# Patient Record
Sex: MALE | Race: WHITE | NOT HISPANIC OR LATINO | Employment: FULL TIME | URBAN - METROPOLITAN AREA
[De-identification: names, ages, dates, MRNs, and addresses within clinical notes are randomized per-mention and may not be internally consistent; named-entity substitution may affect disease eponyms.]

---

## 2018-03-22 ENCOUNTER — OFFICE VISIT (OUTPATIENT)
Dept: URGENT CARE | Facility: CLINIC | Age: 53
End: 2018-03-22
Payer: COMMERCIAL

## 2018-03-22 VITALS
DIASTOLIC BLOOD PRESSURE: 91 MMHG | WEIGHT: 175 LBS | OXYGEN SATURATION: 98 % | SYSTOLIC BLOOD PRESSURE: 139 MMHG | HEART RATE: 80 BPM | RESPIRATION RATE: 16 BRPM | BODY MASS INDEX: 25.92 KG/M2 | TEMPERATURE: 98 F | HEIGHT: 69 IN

## 2018-03-22 DIAGNOSIS — J01.00 ACUTE MAXILLARY SINUSITIS, RECURRENCE NOT SPECIFIED: Primary | ICD-10-CM

## 2018-03-22 PROCEDURE — 96372 THER/PROPH/DIAG INJ SC/IM: CPT | Mod: S$GLB,,, | Performed by: EMERGENCY MEDICINE

## 2018-03-22 PROCEDURE — 99203 OFFICE O/P NEW LOW 30 MIN: CPT | Mod: 25,S$GLB,, | Performed by: NURSE PRACTITIONER

## 2018-03-22 RX ORDER — BETAMETHASONE SODIUM PHOSPHATE AND BETAMETHASONE ACETATE 3; 3 MG/ML; MG/ML
9 INJECTION, SUSPENSION INTRA-ARTICULAR; INTRALESIONAL; INTRAMUSCULAR; SOFT TISSUE ONCE
Status: COMPLETED | OUTPATIENT
Start: 2018-03-22 | End: 2018-03-22

## 2018-03-22 RX ORDER — AMOXICILLIN AND CLAVULANATE POTASSIUM 875; 125 MG/1; MG/1
1 TABLET, FILM COATED ORAL 2 TIMES DAILY
Qty: 20 TABLET | Refills: 0 | Status: SHIPPED | OUTPATIENT
Start: 2018-03-22 | End: 2018-04-01

## 2018-03-22 RX ORDER — FINASTERIDE 1 MG/1
5 TABLET, FILM COATED ORAL DAILY
COMMUNITY

## 2018-03-22 RX ADMIN — BETAMETHASONE SODIUM PHOSPHATE AND BETAMETHASONE ACETATE 9 MG: 3; 3 INJECTION, SUSPENSION INTRA-ARTICULAR; INTRALESIONAL; INTRAMUSCULAR; SOFT TISSUE at 04:03

## 2018-03-22 NOTE — PROGRESS NOTES
"Subjective:       Patient ID: Alirio Hooper is a 53 y.o. male.    Vitals:  height is 5' 9" (1.753 m) and weight is 79.4 kg (175 lb). His temperature is 98.3 °F (36.8 °C). His blood pressure is 139/91 (abnormal) and his pulse is 80. His respiration is 16 and oxygen saturation is 98%.     Chief Complaint: Sinus Problem    Pt having some sinus pressure, started today. Been having a cold for about a week. Pt blowing out mucus out of the nose. Feeling fatigued. Denies body aches. Pt tried taking mucinex.       Sinus Problem   This is a new problem. The current episode started today. His pain is at a severity of 6/10. Associated symptoms include congestion, headaches and sinus pressure. Pertinent negatives include no chills, coughing, ear pain, hoarse voice, shortness of breath or sore throat.     Review of Systems   Constitution: Positive for malaise/fatigue. Negative for chills, decreased appetite and fever.   HENT: Positive for congestion and sinus pressure. Negative for ear pain, hoarse voice and sore throat.    Eyes: Negative for discharge and redness.   Cardiovascular: Negative for chest pain, dyspnea on exertion and leg swelling.   Respiratory: Negative for cough, shortness of breath, sputum production and wheezing.    Musculoskeletal: Negative for myalgias.   Gastrointestinal: Negative for abdominal pain, constipation, diarrhea and nausea.   Neurological: Positive for headaches. Negative for dizziness and light-headedness.       Objective:      Physical Exam   Constitutional: He is oriented to person, place, and time. He appears well-developed and well-nourished. He is cooperative.  Non-toxic appearance. He does not appear ill. No distress.   HENT:   Head: Normocephalic and atraumatic.   Right Ear: Hearing, tympanic membrane and ear canal normal.   Left Ear: Hearing, tympanic membrane and ear canal normal.   Nose: Sinus tenderness present. No mucosal edema, rhinorrhea or nasal deformity. No epistaxis. Right sinus " exhibits maxillary sinus tenderness. Right sinus exhibits no frontal sinus tenderness. Left sinus exhibits no maxillary sinus tenderness and no frontal sinus tenderness.   Mouth/Throat: Uvula is midline and mucous membranes are normal. No trismus in the jaw. Normal dentition. No uvula swelling. Posterior oropharyngeal edema and posterior oropharyngeal erythema present. Tonsils are 1+ on the left.   Eyes: Conjunctivae and lids are normal. Right eye exhibits no discharge. Left eye exhibits no discharge. No scleral icterus.   Sclera clear bilat   Neck: Trachea normal, normal range of motion, full passive range of motion without pain and phonation normal. Neck supple.   Cardiovascular: Normal rate, regular rhythm, normal heart sounds, intact distal pulses and normal pulses.    Pulmonary/Chest: Effort normal and breath sounds normal. No respiratory distress.   Abdominal: Soft. Normal appearance. He exhibits no distension, no pulsatile midline mass and no mass. There is no tenderness.   Musculoskeletal: Normal range of motion. He exhibits no edema or deformity.   Neurological: He is alert and oriented to person, place, and time. He exhibits normal muscle tone. Coordination normal.   Skin: Skin is warm, dry and intact. He is not diaphoretic. No pallor.   Psychiatric: He has a normal mood and affect. His speech is normal and behavior is normal. Judgment and thought content normal. Cognition and memory are normal.   Nursing note and vitals reviewed.      Assessment:       1. Acute maxillary sinusitis, recurrence not specified        Plan:         Patient Instructions     Sinusitis (Antibiotic Treatment)    The sinuses are air-filled spaces within the bones of the face. They connect to the inside of the nose. Sinusitis is an inflammation of the tissue lining the sinus cavity. Sinus inflammation can occur during a cold. It can also be due to allergies to pollens and other particles in the air. Sinusitis can cause symptoms of  sinus congestion and fullness. A sinus infection causes fever, headache and facial pain. There is often green or yellow drainage from the nose or into the back of the throat (post-nasal drip). You have been given antibiotics to treat this condition.  Home care:  · Take the full course of antibiotics as instructed. Do not stop taking them, even if you feel better.  · Drink plenty of water, hot tea, and other liquids. This may help thin mucus. It also may promote sinus drainage.  · Heat may help soothe painful areas of the face. Use a towel soaked in hot water. Or,  the shower and direct the hot spray onto your face. Using a vaporizer along with a menthol rub at night may also help.   · An expectorant containing guaifenesin may help thin the mucus and promote drainage from the sinuses.  · Over-the-counter decongestants may be used unless a similar medicine was prescribed. Nasal sprays work the fastest. Use one that contains phenylephrine or oxymetazoline. First blow the nose gently. Then use the spray. Do not use these medicines more often than directed on the label or symptoms may get worse. You may also use tablets containing pseudoephedrine. Avoid products that combine ingredients, because side effects may be increased. Read labels. You can also ask the pharmacist for help. (NOTE: Persons with high blood pressure should not use decongestants. They can raise blood pressure.)  · Over-the-counter antihistamines may help if allergies contributed to your sinusitis.    · Do not use nasal rinses or irrigation during an acute sinus infection, unless told to by your health care provider. Rinsing may spread the infection to other sinuses.  · Use acetaminophen or ibuprofen to control pain, unless another pain medicine was prescribed. (If you have chronic liver or kidney disease or ever had a stomach ulcer, talk with your doctor before using these medicines. Aspirin should never be used in anyone under 18 years of age  who is ill with a fever. It may cause severe liver damage.)  · Don't smoke. This can worsen symptoms.  Follow-up care  Follow up with your healthcare provider or our staff if you are not improving within the next week.  When to seek medical advice  Call your healthcare provider if any of these occur:  · Facial pain or headache becoming more severe  · Stiff neck  · Unusual drowsiness or confusion  · Swelling of the forehead or eyelids  · Vision problems, including blurred or double vision  · Fever of 100.4ºF (38ºC) or higher, or as directed by your healthcare provider  · Seizure  · Breathing problems  · Symptoms not resolving within 10 days  Date Last Reviewed: 4/13/2015  © 2372-4482 [employer]. [address] [place] [address], [place] [address]. All rights reserved. This information is not intended as a substitute for professional medical care. Always follow your healthcare professional's instructions.              Acute maxillary sinusitis, recurrence not specified    Other orders  -     betamethasone acetate-betamethasone sodium phosphate injection 9 mg; Inject 1.5 mLs (9 mg total) into the muscle once.  -     amoxicillin-clavulanate 875-125mg (AUGMENTIN) 875-125 mg per tablet; Take 1 tablet by mouth 2 (two) times daily.  Dispense: 20 tablet; Refill: 0

## 2018-03-22 NOTE — PATIENT INSTRUCTIONS
Sinusitis (Antibiotic Treatment)    The sinuses are air-filled spaces within the bones of the face. They connect to the inside of the nose. Sinusitis is an inflammation of the tissue lining the sinus cavity. Sinus inflammation can occur during a cold. It can also be due to allergies to pollens and other particles in the air. Sinusitis can cause symptoms of sinus congestion and fullness. A sinus infection causes fever, headache and facial pain. There is often green or yellow drainage from the nose or into the back of the throat (post-nasal drip). You have been given antibiotics to treat this condition.  Home care:  · Take the full course of antibiotics as instructed. Do not stop taking them, even if you feel better.  · Drink plenty of water, hot tea, and other liquids. This may help thin mucus. It also may promote sinus drainage.  · Heat may help soothe painful areas of the face. Use a towel soaked in hot water. Or,  the shower and direct the hot spray onto your face. Using a vaporizer along with a menthol rub at night may also help.   · An expectorant containing guaifenesin may help thin the mucus and promote drainage from the sinuses.  · Over-the-counter decongestants may be used unless a similar medicine was prescribed. Nasal sprays work the fastest. Use one that contains phenylephrine or oxymetazoline. First blow the nose gently. Then use the spray. Do not use these medicines more often than directed on the label or symptoms may get worse. You may also use tablets containing pseudoephedrine. Avoid products that combine ingredients, because side effects may be increased. Read labels. You can also ask the pharmacist for help. (NOTE: Persons with high blood pressure should not use decongestants. They can raise blood pressure.)  · Over-the-counter antihistamines may help if allergies contributed to your sinusitis.    · Do not use nasal rinses or irrigation during an acute sinus infection, unless told to by  your health care provider. Rinsing may spread the infection to other sinuses.  · Use acetaminophen or ibuprofen to control pain, unless another pain medicine was prescribed. (If you have chronic liver or kidney disease or ever had a stomach ulcer, talk with your doctor before using these medicines. Aspirin should never be used in anyone under 18 years of age who is ill with a fever. It may cause severe liver damage.)  · Don't smoke. This can worsen symptoms.  Follow-up care  Follow up with your healthcare provider or our staff if you are not improving within the next week.  When to seek medical advice  Call your healthcare provider if any of these occur:  · Facial pain or headache becoming more severe  · Stiff neck  · Unusual drowsiness or confusion  · Swelling of the forehead or eyelids  · Vision problems, including blurred or double vision  · Fever of 100.4ºF (38ºC) or higher, or as directed by your healthcare provider  · Seizure  · Breathing problems  · Symptoms not resolving within 10 days  Date Last Reviewed: 4/13/2015  © 9221-9654 [email or web address]. [address] [place] [address], [place] [address]. All rights reserved. This information is not intended as a substitute for professional medical care. Always follow your healthcare professional's instructions.

## 2018-05-19 ENCOUNTER — OFFICE VISIT (OUTPATIENT)
Dept: URGENT CARE | Facility: CLINIC | Age: 53
End: 2018-05-19
Payer: COMMERCIAL

## 2018-05-19 VITALS
HEIGHT: 69 IN | DIASTOLIC BLOOD PRESSURE: 72 MMHG | WEIGHT: 175 LBS | RESPIRATION RATE: 18 BRPM | OXYGEN SATURATION: 99 % | HEART RATE: 86 BPM | SYSTOLIC BLOOD PRESSURE: 108 MMHG | TEMPERATURE: 98 F | BODY MASS INDEX: 25.92 KG/M2

## 2018-05-19 DIAGNOSIS — M79.662 PAIN OF LEFT CALF: Primary | ICD-10-CM

## 2018-05-19 PROCEDURE — 99214 OFFICE O/P EST MOD 30 MIN: CPT | Mod: S$GLB,,, | Performed by: NURSE PRACTITIONER

## 2018-05-19 PROCEDURE — 3008F BODY MASS INDEX DOCD: CPT | Mod: CPTII,S$GLB,, | Performed by: NURSE PRACTITIONER

## 2018-05-19 NOTE — PATIENT INSTRUCTIONS
TAKE ALIEVE AS DIRECTED FOR DISCOMFORT.  ELEVATE, ICE AREA FOR 20 MINUTES 4-5 TIMES A DAY FOR THE NEXT 2 DAYS.  IF SYMPTOMS PERSIST OR YOU START TO HAVE SWELLING, REDNESS, INCREASE IN PAIN, FOLLOW UP WITH PRIMARY CARE FOR ULTRASOUND OF LE OR THE EMERGENCY DEPARTMENT.         R.I.C.E.    R.I.C.E. stands for Rest, Ice, Compression, and Elevation. Doing these things helps limit pain and swelling after an injury. R.I.C.E. also helps injuries heal faster. Use R.I.C.E. for sprains, strains, and severe bruises or bumps. Follow the tips on this handout and begin R.I.C.E. as soon as possible after an injury.  ? Rest  Pain is your bodys way of telling you to rest an injured area. Whether you have hurt an elbow, hand, foot, or knee, limiting its use will prevent further injury and help you heal.  ? Ice  Applying ice right after an injury helps prevent swelling and reduce pain. Dont place ice directly on your skin.  · Wrap a cold pack or bag of ice in a thin cloth. Place it over the injured area.  · Ice for 10 minutes every 3 hours. Dont ice for more than 20 minutes at a time.  ? Compression  Putting pressure (compression) on an injury helps prevent swelling and provides support.  · Wrap the injured area firmly with an elastic bandage. If your hand or foot tingles, becomes discolored, or feels cold to the touch, the bandage may be too tight. Rewrap it more loosely.  · If your bandage becomes too loose, rewrap it.  · Do not wear an elastic bandage overnight.  ? Elevation  Keeping an injury elevated helps reduce swelling, pain, and throbbing. Elevation is most effective when the injury is kept elevated higher than the heart.     Call your healthcare provider if you notice any of the following:  · Fingers or toes feel numb, are cold to the touch, or change color  · Skin looks shiny or tight  · Pain, swelling, or bruising worsens and is not improved with elevation   Date Last Reviewed: 9/3/2015  © 9555-7016 [employer]. [address] [place] [address]. All rights reserved. This information is not intended as a substitute for professional medical care. Always follow your healthcare professional's instructions.        Calf Raise (Strength)    1. Stand up straight with both feet flat on the floor, slightly apart. Hold onto a sturdy chair, railing, counter, or table.  2. Raise both heels so youre standing on the balls of your feet. Dont lock your knees or arch your back. Hold for 5 seconds. Then slowly lower your heels back down to the floor.  3. Repeat 10 times, or as instructed.  4. Do this exercise 3 times a day, or as instructed.     Challenge yourself  As you become stronger, do this exercise on one foot at a time.   Date Last Reviewed: 3/10/2016  © 3964-3970 [employer]. [address] [place] [address]. All rights reserved. This information is not intended as a substitute for professional medical care. Always follow your healthcare professional's instructions.

## 2018-05-19 NOTE — PROGRESS NOTES
"Subjective:       Patient ID: Alirio Hooper is a 53 y.o. male.    Vitals:    05/19/18 1749   BP: 108/72   Pulse: 86   Resp: 18   Temp: 98.3 °F (36.8 °C)   SpO2: 99%   Weight: 79.4 kg (175 lb)   Height: 5' 9" (1.753 m)       Chief Complaint: Leg Pain (CALF 2 DAYS NOW LT LEG )    Patient started with aching pain around mid calf, left leg x 1 day. No pain when not moving, bearing weight. Denies a particular incident to cause injury. 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 He denies recent long travel, denies recent injury, long immobilization, denies warmth to touch or leg swelling. Non smoker, no anticoagulant use. Denies shortness of breath.      Leg Pain    The incident occurred 2 days ago. The incident occurred at home. The injury mechanism is unknown. The pain is present in the left leg. The quality of the pain is described as aching. The pain is at a severity of 4/10. The pain is mild. The pain has been constant since onset. He reports no foreign bodies present. Exacerbated by: FOOT MOVEMENT  He has tried nothing for the symptoms.     Review of Systems   Constitution: Negative for chills and fever.   HENT: Negative for sore throat.    Eyes: Negative for blurred vision.   Cardiovascular: Negative for chest pain.   Respiratory: Negative for shortness of breath.    Skin: Negative for rash.   Musculoskeletal: Negative for back pain and joint pain.   Gastrointestinal: Negative for abdominal pain, diarrhea, nausea and vomiting.   Neurological: Negative for headaches.   Psychiatric/Behavioral: The patient is not nervous/anxious.        Objective:      Physical Exam   Constitutional: He is oriented to person, place, and time. He appears well-developed and well-nourished. He is cooperative.  Non-toxic appearance. He does not appear ill. No distress.   HENT:   Head: Normocephalic and atraumatic. Head is without abrasion, without contusion and without laceration.   Right Ear: Hearing, tympanic membrane, external ear and ear " canal normal. No hemotympanum.   Left Ear: Hearing, tympanic membrane, external ear and ear canal normal. No hemotympanum.   Nose: Nose normal. No mucosal edema, rhinorrhea or nasal deformity. No epistaxis. Right sinus exhibits no maxillary sinus tenderness and no frontal sinus tenderness. Left sinus exhibits no maxillary sinus tenderness and no frontal sinus tenderness.   Mouth/Throat: Uvula is midline, oropharynx is clear and moist and mucous membranes are normal. No trismus in the jaw. Normal dentition. No uvula swelling. No posterior oropharyngeal erythema.   Eyes: Conjunctivae, EOM and lids are normal. Pupils are equal, round, and reactive to light. Right eye exhibits no discharge. Left eye exhibits no discharge. No scleral icterus.   Sclera clear bilat   Neck: Trachea normal, normal range of motion, full passive range of motion without pain and phonation normal. Neck supple. No spinous process tenderness and no muscular tenderness present. No neck rigidity. No tracheal deviation present.   Cardiovascular: Normal rate, regular rhythm, intact distal pulses and normal pulses.    Pulmonary/Chest: Effort normal. No respiratory distress.   Abdominal: Soft. Normal appearance and bowel sounds are normal. He exhibits no distension, no pulsatile midline mass and no mass. There is no tenderness.   Musculoskeletal: Normal range of motion. He exhibits no deformity.        Right shoulder: He exhibits normal range of motion, no tenderness (left lateral mid-calf tenderness to deep palpation), no bony tenderness, no swelling, no deformity, no laceration, no pain (aching with dorsiflexion), no spasm, normal pulse and normal strength.        Left lower leg: He exhibits tenderness (tenderness lateral, mid calf to deep palpation). He exhibits no swelling, no edema, no deformity and no laceration.        Legs:  RIGHT CALF SUPERIOR ASPECT: 35CM, MEDIAL CALF 39 1/2CM    LEFT CALF SUPERIOR ASPECT: 34.5CM, MEDIAL CALF 38.5CM    Neurological: He is alert and oriented to person, place, and time. He has normal strength. No cranial nerve deficit or sensory deficit. He exhibits normal muscle tone. He displays no seizure activity. Coordination normal. GCS eye subscore is 4. GCS verbal subscore is 5. GCS motor subscore is 6.   Skin: Skin is warm, dry and intact. Capillary refill takes less than 2 seconds. No abrasion, no bruising, no burn, no ecchymosis and no laceration noted. He is not diaphoretic. No pallor.   Psychiatric: He has a normal mood and affect. His speech is normal and behavior is normal. Judgment and thought content normal. Cognition and memory are normal.   Nursing note and vitals reviewed.      Assessment:       1. Pain of left calf        Plan:     Advised patient symptoms appear likely musculoskeletal in nature. Advised patient to treat with nsaids, elevation, ice. If no improvement, he should follow up with his pcp Monday for possible ultrasound. Advised if calf becomes warm, swollen, more painful, to go directly to emergency department. Patient agreed and verbalized understanding.     Alirio was seen today for leg pain.    Diagnoses and all orders for this visit:    Pain of left calf          Patient Instructions     TAKE ALIEVE AS DIRECTED FOR DISCOMFORT.  ELEVATE, ICE AREA FOR 20 MINUTES 4-5 TIMES A DAY FOR THE NEXT 2 DAYS.  IF SYMPTOMS PERSIST OR YOU START TO HAVE SWELLING, REDNESS, INCREASE IN PAIN, FOLLOW UP WITH PRIMARY CARE FOR ULTRASOUND OF LE OR THE EMERGENCY DEPARTMENT.         R.I.C.E.    R.I.C.E. stands for Rest, Ice, Compression, and Elevation. Doing these things helps limit pain and swelling after an injury. R.I.C.E. also helps injuries heal faster. Use R.I.C.E. for sprains, strains, and severe bruises or bumps. Follow the tips on this handout and begin R.I.C.E. as soon as possible after an injury.  ? Rest  Pain is your bodys way of telling you to rest an injured area. Whether you have hurt an elbow, hand,  foot, or knee, limiting its use will prevent further injury and help you heal.  ? Ice  Applying ice right after an injury helps prevent swelling and reduce pain. Dont place ice directly on your skin.  · Wrap a cold pack or bag of ice in a thin cloth. Place it over the injured area.  · Ice for 10 minutes every 3 hours. Dont ice for more than 20 minutes at a time.  ? Compression  Putting pressure (compression) on an injury helps prevent swelling and provides support.  · Wrap the injured area firmly with an elastic bandage. If your hand or foot tingles, becomes discolored, or feels cold to the touch, the bandage may be too tight. Rewrap it more loosely.  · If your bandage becomes too loose, rewrap it.  · Do not wear an elastic bandage overnight.  ? Elevation  Keeping an injury elevated helps reduce swelling, pain, and throbbing. Elevation is most effective when the injury is kept elevated higher than the heart.     Call your healthcare provider if you notice any of the following:  · Fingers or toes feel numb, are cold to the touch, or change color  · Skin looks shiny or tight  · Pain, swelling, or bruising worsens and is not improved with elevation   Date Last Reviewed: 9/3/2015  © 1808-2500 [email or web address]. [address] [place] [address], [place] [address]. All rights reserved. This information is not intended as a substitute for professional medical care. Always follow your healthcare professional's instructions.        Calf Raise (Strength)    1. Stand up straight with both feet flat on the floor, slightly apart. Hold onto a sturdy chair, railing, counter, or table.  2. Raise both heels so youre standing on the balls of your feet. Dont lock your knees or arch your back. Hold for 5 seconds. Then slowly lower your heels back down to the floor.  3. Repeat 10 times, or as instructed.  4. Do this exercise 3 times a day, or as instructed.     Challenge yourself  As you become stronger, do this exercise on one foot  at a time.   Date Last Reviewed: 3/10/2016  © 3882-7449 [employer]. [address] [place] [address], [place] [address]. All rights reserved. This information is not intended as a substitute for professional medical care. Always follow your healthcare professional's instructions.

## 2019-02-26 ENCOUNTER — OFFICE VISIT (OUTPATIENT)
Dept: URGENT CARE | Facility: CLINIC | Age: 54
End: 2019-02-26
Payer: COMMERCIAL

## 2019-02-26 VITALS
OXYGEN SATURATION: 98 % | RESPIRATION RATE: 12 BRPM | BODY MASS INDEX: 23.7 KG/M2 | WEIGHT: 160 LBS | SYSTOLIC BLOOD PRESSURE: 108 MMHG | HEART RATE: 90 BPM | TEMPERATURE: 98 F | HEIGHT: 69 IN | DIASTOLIC BLOOD PRESSURE: 70 MMHG

## 2019-02-26 DIAGNOSIS — H92.03 OTALGIA OF BOTH EARS: ICD-10-CM

## 2019-02-26 DIAGNOSIS — J01.00 ACUTE NON-RECURRENT MAXILLARY SINUSITIS: Primary | ICD-10-CM

## 2019-02-26 PROCEDURE — 99214 OFFICE O/P EST MOD 30 MIN: CPT | Mod: S$GLB,,, | Performed by: NURSE PRACTITIONER

## 2019-02-26 PROCEDURE — 3008F BODY MASS INDEX DOCD: CPT | Mod: CPTII,S$GLB,, | Performed by: NURSE PRACTITIONER

## 2019-02-26 PROCEDURE — 3008F PR BODY MASS INDEX (BMI) DOCUMENTED: ICD-10-PCS | Mod: CPTII,S$GLB,, | Performed by: NURSE PRACTITIONER

## 2019-02-26 PROCEDURE — 99214 PR OFFICE/OUTPT VISIT, EST, LEVL IV, 30-39 MIN: ICD-10-PCS | Mod: S$GLB,,, | Performed by: NURSE PRACTITIONER

## 2019-02-26 RX ORDER — AMOXICILLIN AND CLAVULANATE POTASSIUM 875; 125 MG/1; MG/1
1 TABLET, FILM COATED ORAL 2 TIMES DAILY
Qty: 20 TABLET | Refills: 0 | Status: SHIPPED | OUTPATIENT
Start: 2019-02-26 | End: 2019-03-08

## 2019-02-26 RX ORDER — EMTRICITABINE AND TENOFOVIR DISOPROXIL FUMARATE 200; 300 MG/1; MG/1
TABLET, FILM COATED ORAL
COMMUNITY
Start: 2019-02-13 | End: 2022-12-16

## 2019-02-26 NOTE — PATIENT INSTRUCTIONS
Continue using Flonase twice daily.    Sinusitis (Antibiotic Treatment)    The sinuses are air-filled spaces within the bones of the face. They connect to the inside of the nose. Sinusitis is an inflammation of the tissue lining the sinus cavity. Sinus inflammation can occur during a cold. It can also be due to allergies to pollens and other particles in the air. Sinusitis can cause symptoms of sinus congestion and fullness. A sinus infection causes fever, headache and facial pain. There is often green or yellow drainage from the nose or into the back of the throat (post-nasal drip). You have been given antibiotics to treat this condition.  Home care:  · Take the full course of antibiotics as instructed. Do not stop taking them, even if you feel better.  · Drink plenty of water, hot tea, and other liquids. This may help thin mucus. It also may promote sinus drainage.  · Heat may help soothe painful areas of the face. Use a towel soaked in hot water. Or,  the shower and direct the hot spray onto your face. Using a vaporizer along with a menthol rub at night may also help.   · An expectorant containing guaifenesin may help thin the mucus and promote drainage from the sinuses.  · Over-the-counter decongestants may be used unless a similar medicine was prescribed. Nasal sprays work the fastest. Use one that contains phenylephrine or oxymetazoline. First blow the nose gently. Then use the spray. Do not use these medicines more often than directed on the label or symptoms may get worse. You may also use tablets containing pseudoephedrine. Avoid products that combine ingredients, because side effects may be increased. Read labels. You can also ask the pharmacist for help. (NOTE: Persons with high blood pressure should not use decongestants. They can raise blood pressure.)  · Over-the-counter antihistamines may help if allergies contributed to your sinusitis.    · Do not use nasal rinses or irrigation during an  acute sinus infection, unless told to by your health care provider. Rinsing may spread the infection to other sinuses.  · Use acetaminophen or ibuprofen to control pain, unless another pain medicine was prescribed. (If you have chronic liver or kidney disease or ever had a stomach ulcer, talk with your doctor before using these medicines. Aspirin should never be used in anyone under 18 years of age who is ill with a fever. It may cause severe liver damage.)  · Don't smoke. This can worsen symptoms.  Follow-up care  Follow up with your healthcare provider or our staff if you are not improving within the next week.  When to seek medical advice  Call your healthcare provider if any of these occur:  · Facial pain or headache becoming more severe  · Stiff neck  · Unusual drowsiness or confusion  · Swelling of the forehead or eyelids  · Vision problems, including blurred or double vision  · Fever of 100.4ºF (38ºC) or higher, or as directed by your healthcare provider  · Seizure  · Breathing problems  · Symptoms not resolving within 10 days  Date Last Reviewed: 4/13/2015  © 6386-5284 [employer]. [address] [place] [address], [place] [address]. All rights reserved. This information is not intended as a substitute for professional medical care. Always follow your healthcare professional's instructions.

## 2019-03-01 ENCOUNTER — TELEPHONE (OUTPATIENT)
Dept: URGENT CARE | Facility: CLINIC | Age: 54
End: 2019-03-01

## 2019-05-13 ENCOUNTER — OFFICE VISIT (OUTPATIENT)
Dept: URGENT CARE | Facility: CLINIC | Age: 54
End: 2019-05-13
Payer: COMMERCIAL

## 2019-05-13 VITALS
WEIGHT: 165 LBS | TEMPERATURE: 98 F | HEART RATE: 69 BPM | RESPIRATION RATE: 14 BRPM | OXYGEN SATURATION: 98 % | BODY MASS INDEX: 24.44 KG/M2 | SYSTOLIC BLOOD PRESSURE: 108 MMHG | HEIGHT: 69 IN | DIASTOLIC BLOOD PRESSURE: 73 MMHG

## 2019-05-13 DIAGNOSIS — J01.01 ACUTE RECURRENT MAXILLARY SINUSITIS: Primary | ICD-10-CM

## 2019-05-13 PROCEDURE — 3008F BODY MASS INDEX DOCD: CPT | Mod: CPTII,S$GLB,, | Performed by: NURSE PRACTITIONER

## 2019-05-13 PROCEDURE — 99214 PR OFFICE/OUTPT VISIT, EST, LEVL IV, 30-39 MIN: ICD-10-PCS | Mod: S$GLB,,, | Performed by: NURSE PRACTITIONER

## 2019-05-13 PROCEDURE — 3008F PR BODY MASS INDEX (BMI) DOCUMENTED: ICD-10-PCS | Mod: CPTII,S$GLB,, | Performed by: NURSE PRACTITIONER

## 2019-05-13 PROCEDURE — 99214 OFFICE O/P EST MOD 30 MIN: CPT | Mod: S$GLB,,, | Performed by: NURSE PRACTITIONER

## 2019-05-13 RX ORDER — AMOXICILLIN AND CLAVULANATE POTASSIUM 875; 125 MG/1; MG/1
1 TABLET, FILM COATED ORAL 2 TIMES DAILY
Qty: 20 TABLET | Refills: 0 | Status: SHIPPED | OUTPATIENT
Start: 2019-05-13 | End: 2019-05-23

## 2019-05-13 NOTE — PROGRESS NOTES
"Subjective:       Patient ID: Alirio Hooper is a 54 y.o. male.    Vitals:  height is 5' 9" (1.753 m) and weight is 74.8 kg (165 lb). His oral temperature is 97.6 °F (36.4 °C). His blood pressure is 108/73 and his pulse is 69. His respiration is 14 and oxygen saturation is 98%.     Chief Complaint: Sinus Problem    Patient presents congestion, headaches, and sinus pressure that occurred a week ago.  He has been taking OTC decongestant, and states no relief to his symptoms. He did have a sore throat at the beginning, but it now has subsided.     Chronic history of recurrent sinusitis. Seen by ENT with no clear etiology. Last abx for same was 2/2019.  Endorses several sick contacts at work.  Has not been taking Flonase or similar since last episode, but still has product at home.    Sinus Problem   This is a new problem. The current episode started in the past 7 days. The problem has been gradually worsening since onset. There has been no fever. His pain is at a severity of 6/10. The pain is moderate. Associated symptoms include congestion, headaches, a hoarse voice, sinus pressure and sneezing. Pertinent negatives include no chills, coughing, diaphoresis, ear pain, neck pain, shortness of breath, sore throat or swollen glands. Past treatments include oral decongestants. The treatment provided no relief.       Constitution: Negative for chills, sweating, fatigue and fever.   HENT: Positive for congestion, sinus pain, sinus pressure and voice change. Negative for ear pain and sore throat.    Neck: Negative for neck pain and painful lymph nodes.   Eyes: Positive for eye pain. Negative for eye redness.   Respiratory: Negative for chest tightness, cough, sputum production, bloody sputum, COPD, shortness of breath, stridor, wheezing and asthma.    Gastrointestinal: Negative for nausea and vomiting.   Musculoskeletal: Negative for muscle ache.   Skin: Negative for rash.   Allergic/Immunologic: Positive for sneezing. Negative " for seasonal allergies and asthma.   Neurological: Positive for headaches.   Hematologic/Lymphatic: Negative for swollen lymph nodes.       Objective:      Physical Exam   Constitutional: He is oriented to person, place, and time. He appears well-developed and well-nourished. He is cooperative.  Non-toxic appearance. He does not appear ill. No distress.   HENT:   Head: Normocephalic and atraumatic.   Right Ear: Hearing, tympanic membrane, external ear and ear canal normal.   Left Ear: Hearing, tympanic membrane, external ear and ear canal normal.   Nose: Mucosal edema and rhinorrhea present. No nasal deformity. No epistaxis. Right sinus exhibits no maxillary sinus tenderness and no frontal sinus tenderness. Left sinus exhibits maxillary sinus tenderness. Left sinus exhibits no frontal sinus tenderness.   Mouth/Throat: Uvula is midline and mucous membranes are normal. No trismus in the jaw. Normal dentition. No uvula swelling. Posterior oropharyngeal erythema present.       Eyes: Conjunctivae and lids are normal. No scleral icterus.   Sclera clear bilat   Neck: Trachea normal, full passive range of motion without pain and phonation normal. Neck supple.   Cardiovascular: Normal rate, regular rhythm, normal heart sounds, intact distal pulses and normal pulses.   Pulmonary/Chest: Effort normal and breath sounds normal. No stridor. No respiratory distress. He has no decreased breath sounds. He has no wheezes.   Abdominal: Soft. Normal appearance and bowel sounds are normal. He exhibits no distension. There is no tenderness.   Musculoskeletal: Normal range of motion. He exhibits no edema or deformity.   Neurological: He is alert and oriented to person, place, and time. He exhibits normal muscle tone. Coordination normal.   Skin: Skin is warm, dry and intact. He is not diaphoretic. No pallor.   Psychiatric: He has a normal mood and affect. His speech is normal and behavior is normal. Judgment and thought content normal.  Cognition and memory are normal.   Nursing note and vitals reviewed.      Assessment:       1. Acute recurrent maxillary sinusitis        Plan:         Acute recurrent maxillary sinusitis  -     amoxicillin-clavulanate 875-125mg (AUGMENTIN) 875-125 mg per tablet; Take 1 tablet by mouth 2 (two) times daily. for 10 days  Dispense: 20 tablet; Refill: 0      Patient Instructions     You may continue taking Tylenol (acetaminophen) or ibuprofen for pain and fever.    Please continue to take Flonase twice daily for the next 14 days.    Acute Sinusitis     Acute sinusitis is irritation and swelling of the sinuses. It is usually caused by a viral infection after a common cold. Your doctor can help you find relief.  What is acute sinusitis?  Sinuses are air-filled spaces in the skull behind the face. They are kept moist and clean by a lining of mucosa. Things such as pollen, smoke, and chemical fumes can irritate the mucosa. It can then swell up. As a response to irritation, the mucosa makes more mucus and other fluids. Tiny hairlike cilia cover the mucosa. Cilia help carry mucus toward the opening of the sinus. Too much mucus may cause the cilia to stop working. This blocks the sinus opening. A buildup of fluid in the sinuses then causes pain and pressure. It can also encourage bacteria to grow in the sinuses.  Common symptoms of acute sinusitis  You may have:  · Facial soreness pain  · Headache  · Fever  · Fluid draining in the back of the throat (postnasal drip)  · Congestion  · Drainage that is thick and colored, instead of clear  · Cough  Diagnosing acute sinusitis  Your doctor will ask about your symptoms and health history. He or she will look at your ear, nose, and throat. You usually won't need to have X-rays taken.    The doctor may take a sample of mucus to check for bacteria. If you have sinusitis that keeps coming back, you may need imaging tests such as X-rays or CAT scans. This will help your doctor check for a  structural problem that may be causing the infection.  Treating acute sinusitis  Treatment is aimed at unblocking the sinus opening and helping the cilia work again. You may need to take antihistamine and decongestant medicine. These can reduce inflammation and decrease the amount of fluid your sinuses make. If you have a bacterial infection, you will need to take antibiotic medicine for 10 to 14 days. Take this medicine until it is gone, even if you feel better.  Date Last Reviewed: 10/1/2016  © 7926-7818 [employer]. [address] [place] [address], [place] [address]. All rights reserved. This information is not intended as a substitute for professional medical care. Always follow your healthcare professional's instructions.

## 2019-05-13 NOTE — PATIENT INSTRUCTIONS
You may continue taking Tylenol (acetaminophen) or ibuprofen for pain and fever.    Please continue to take Flonase twice daily for the next 14 days.    Acute Sinusitis     Acute sinusitis is irritation and swelling of the sinuses. It is usually caused by a viral infection after a common cold. Your doctor can help you find relief.  What is acute sinusitis?  Sinuses are air-filled spaces in the skull behind the face. They are kept moist and clean by a lining of mucosa. Things such as pollen, smoke, and chemical fumes can irritate the mucosa. It can then swell up. As a response to irritation, the mucosa makes more mucus and other fluids. Tiny hairlike cilia cover the mucosa. Cilia help carry mucus toward the opening of the sinus. Too much mucus may cause the cilia to stop working. This blocks the sinus opening. A buildup of fluid in the sinuses then causes pain and pressure. It can also encourage bacteria to grow in the sinuses.  Common symptoms of acute sinusitis  You may have:  · Facial soreness pain  · Headache  · Fever  · Fluid draining in the back of the throat (postnasal drip)  · Congestion  · Drainage that is thick and colored, instead of clear  · Cough  Diagnosing acute sinusitis  Your doctor will ask about your symptoms and health history. He or she will look at your ear, nose, and throat. You usually won't need to have X-rays taken.    The doctor may take a sample of mucus to check for bacteria. If you have sinusitis that keeps coming back, you may need imaging tests such as X-rays or CAT scans. This will help your doctor check for a structural problem that may be causing the infection.  Treating acute sinusitis  Treatment is aimed at unblocking the sinus opening and helping the cilia work again. You may need to take antihistamine and decongestant medicine. These can reduce inflammation and decrease the amount of fluid your sinuses make. If you have a bacterial infection, you will need to take antibiotic  medicine for 10 to 14 days. Take this medicine until it is gone, even if you feel better.  Date Last Reviewed: 10/1/2016  © 5848-1593 [employer]. [address] [place] [address], [place] [address]. All rights reserved. This information is not intended as a substitute for professional medical care. Always follow your healthcare professional's instructions.

## 2019-05-16 ENCOUNTER — TELEPHONE (OUTPATIENT)
Dept: URGENT CARE | Facility: CLINIC | Age: 54
End: 2019-05-16

## 2020-04-21 ENCOUNTER — OFFICE VISIT (OUTPATIENT)
Dept: URGENT CARE | Facility: CLINIC | Age: 55
End: 2020-04-21
Payer: COMMERCIAL

## 2020-04-21 ENCOUNTER — HOSPITAL ENCOUNTER (EMERGENCY)
Facility: OTHER | Age: 55
Discharge: HOME | End: 2020-04-21
Attending: EMERGENCY MEDICINE
Payer: COMMERCIAL

## 2020-04-21 VITALS
OXYGEN SATURATION: 99 % | WEIGHT: 165 LBS | SYSTOLIC BLOOD PRESSURE: 120 MMHG | DIASTOLIC BLOOD PRESSURE: 78 MMHG | TEMPERATURE: 98 F | RESPIRATION RATE: 18 BRPM | HEART RATE: 69 BPM | BODY MASS INDEX: 24.44 KG/M2 | HEIGHT: 69 IN

## 2020-04-21 VITALS
HEART RATE: 95 BPM | TEMPERATURE: 98 F | RESPIRATION RATE: 16 BRPM | WEIGHT: 165 LBS | BODY MASS INDEX: 24.44 KG/M2 | OXYGEN SATURATION: 98 % | SYSTOLIC BLOOD PRESSURE: 121 MMHG | HEIGHT: 69 IN | DIASTOLIC BLOOD PRESSURE: 83 MMHG

## 2020-04-21 DIAGNOSIS — S62.101A RIGHT WRIST FRACTURE, CLOSED, INITIAL ENCOUNTER: Primary | ICD-10-CM

## 2020-04-21 DIAGNOSIS — T14.90XA INJURY: ICD-10-CM

## 2020-04-21 DIAGNOSIS — S52.561A CLOSED BARTON'S FRACTURE OF RIGHT RADIUS, INITIAL ENCOUNTER: Primary | ICD-10-CM

## 2020-04-21 PROCEDURE — 73110 XR WRIST COMPLETE 3 VIEWS RIGHT: ICD-10-PCS | Mod: RT,S$GLB,, | Performed by: RADIOLOGY

## 2020-04-21 PROCEDURE — 96374 THER/PROPH/DIAG INJ IV PUSH: CPT | Mod: 59

## 2020-04-21 PROCEDURE — 99284 EMERGENCY DEPT VISIT MOD MDM: CPT | Mod: 25

## 2020-04-21 PROCEDURE — 99214 OFFICE O/P EST MOD 30 MIN: CPT | Mod: S$GLB,,, | Performed by: PHYSICIAN ASSISTANT

## 2020-04-21 PROCEDURE — 63600175 PHARM REV CODE 636 W HCPCS: Performed by: EMERGENCY MEDICINE

## 2020-04-21 PROCEDURE — 96375 TX/PRO/DX INJ NEW DRUG ADDON: CPT | Mod: 59

## 2020-04-21 PROCEDURE — 73110 X-RAY EXAM OF WRIST: CPT | Mod: RT,S$GLB,, | Performed by: RADIOLOGY

## 2020-04-21 PROCEDURE — 29105 APPLICATION LONG ARM SPLINT: CPT | Mod: RT

## 2020-04-21 PROCEDURE — 25605 CLTX DST RDL FX/EPHYS SEP W/: CPT

## 2020-04-21 PROCEDURE — 99214 PR OFFICE/OUTPT VISIT, EST, LEVL IV, 30-39 MIN: ICD-10-PCS | Mod: S$GLB,,, | Performed by: PHYSICIAN ASSISTANT

## 2020-04-21 PROCEDURE — 73130 XR HAND COMPLETE 3 VIEW RIGHT: ICD-10-PCS | Mod: RT,S$GLB,, | Performed by: RADIOLOGY

## 2020-04-21 PROCEDURE — 73130 X-RAY EXAM OF HAND: CPT | Mod: RT,S$GLB,, | Performed by: RADIOLOGY

## 2020-04-21 PROCEDURE — 25000003 PHARM REV CODE 250: Performed by: EMERGENCY MEDICINE

## 2020-04-21 RX ORDER — BUPIVACAINE HYDROCHLORIDE 2.5 MG/ML
5 INJECTION, SOLUTION EPIDURAL; INFILTRATION; INTRACAUDAL
Status: COMPLETED | OUTPATIENT
Start: 2020-04-21 | End: 2020-04-21

## 2020-04-21 RX ORDER — LIDOCAINE HYDROCHLORIDE 20 MG/ML
5 INJECTION, SOLUTION INFILTRATION; PERINEURAL
Status: COMPLETED | OUTPATIENT
Start: 2020-04-21 | End: 2020-04-21

## 2020-04-21 RX ORDER — ONDANSETRON 2 MG/ML
8 INJECTION INTRAMUSCULAR; INTRAVENOUS
Status: COMPLETED | OUTPATIENT
Start: 2020-04-21 | End: 2020-04-21

## 2020-04-21 RX ORDER — HYDROCODONE BITARTRATE AND ACETAMINOPHEN 5; 325 MG/1; MG/1
1-2 TABLET ORAL EVERY 6 HOURS PRN
Qty: 16 TABLET | Refills: 0 | Status: SHIPPED | OUTPATIENT
Start: 2020-04-21 | End: 2020-05-01

## 2020-04-21 RX ORDER — IBUPROFEN 800 MG/1
800 TABLET ORAL EVERY 6 HOURS PRN
Qty: 30 TABLET | Refills: 0 | OUTPATIENT
Start: 2020-04-21 | End: 2020-10-04

## 2020-04-21 RX ORDER — HYDROMORPHONE HYDROCHLORIDE 1 MG/ML
1 INJECTION, SOLUTION INTRAMUSCULAR; INTRAVENOUS; SUBCUTANEOUS
Status: COMPLETED | OUTPATIENT
Start: 2020-04-21 | End: 2020-04-21

## 2020-04-21 RX ADMIN — BUPIVACAINE HYDROCHLORIDE 12.5 MG: 2.5 INJECTION, SOLUTION EPIDURAL; INFILTRATION; INTRACAUDAL; PERINEURAL at 08:04

## 2020-04-21 RX ADMIN — ONDANSETRON 8 MG: 2 INJECTION INTRAMUSCULAR; INTRAVENOUS at 07:04

## 2020-04-21 RX ADMIN — HYDROMORPHONE HYDROCHLORIDE 1 MG: 1 INJECTION, SOLUTION INTRAMUSCULAR; INTRAVENOUS; SUBCUTANEOUS at 07:04

## 2020-04-21 RX ADMIN — LIDOCAINE HYDROCHLORIDE 5 ML: 20 INJECTION, SOLUTION INFILTRATION; PERINEURAL at 08:04
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
## 2020-10-04 ENCOUNTER — HOSPITAL ENCOUNTER (EMERGENCY)
Facility: OTHER | Age: 55
Discharge: HOME | End: 2020-10-04
Attending: EMERGENCY MEDICINE
Payer: COMMERCIAL

## 2020-10-04 VITALS
RESPIRATION RATE: 20 BRPM | HEART RATE: 70 BPM | SYSTOLIC BLOOD PRESSURE: 137 MMHG | BODY MASS INDEX: 24.44 KG/M2 | TEMPERATURE: 98 F | WEIGHT: 165 LBS | HEIGHT: 69 IN | OXYGEN SATURATION: 100 % | DIASTOLIC BLOOD PRESSURE: 89 MMHG

## 2020-10-04 DIAGNOSIS — T14.90XA TRAUMA: ICD-10-CM

## 2020-10-04 DIAGNOSIS — S62.111A TRIQUETRAL CHIP FRACTURE, RIGHT, CLOSED, INITIAL ENCOUNTER: Primary | ICD-10-CM

## 2020-10-04 PROCEDURE — 25000003 PHARM REV CODE 250: Performed by: EMERGENCY MEDICINE

## 2020-10-04 PROCEDURE — 99284 EMERGENCY DEPT VISIT MOD MDM: CPT | Mod: 25

## 2020-10-04 RX ORDER — KETOROLAC TROMETHAMINE 10 MG/1
10 TABLET, FILM COATED ORAL
Status: COMPLETED | OUTPATIENT
Start: 2020-10-04 | End: 2020-10-04

## 2020-10-04 RX ORDER — HYDROCODONE BITARTRATE AND ACETAMINOPHEN 7.5; 325 MG/1; MG/1
1 TABLET ORAL EVERY 6 HOURS PRN
Qty: 12 TABLET | Refills: 0 | Status: SHIPPED | OUTPATIENT
Start: 2020-10-04 | End: 2022-07-13

## 2020-10-04 RX ORDER — ETODOLAC 400 MG/1
400 TABLET, FILM COATED ORAL 2 TIMES DAILY PRN
Qty: 20 TABLET | Refills: 0 | Status: SHIPPED | OUTPATIENT
Start: 2020-10-04 | End: 2022-07-13

## 2020-10-04 RX ADMIN — KETOROLAC TROMETHAMINE 10 MG: 10 TABLET, FILM COATED ORAL at 09:10
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
## 2020-10-04 NOTE — DISCHARGE INSTRUCTIONS
"Please see the more detailed document with printed for you entitled "How are triquetral fractures of the wrist diagnosed and treated?"  "

## 2021-04-16 ENCOUNTER — PATIENT MESSAGE (OUTPATIENT)
Dept: RESEARCH | Facility: HOSPITAL | Age: 56
End: 2021-04-16

## 2021-10-29 NOTE — PROGRESS NOTES
{2021 PROVIDER CHARTING PREFERENCE:760988}    Subjective     Erwin Aguilar is a 64 year old male who presents to clinic today for the following health issues accompanied by his {accompanied to visit:429868}:    HPI     {SUPERLIST (Optional):228612}  {additonal problems for provider to add (Optional):962418}    Review of Systems   {ROS COMP (Optional):796502}      Objective    There were no vitals taken for this visit.  There is no height or weight on file to calculate BMI.  Physical Exam   {Exam List (Optional):662607}    {Diagnostic Test Results (Optional):906033}       "Subjective:       Patient ID: Alirio Hooper is a 54 y.o. male.    Vitals:  height is 5' 9" (1.753 m) and weight is 72.6 kg (160 lb). His oral temperature is 98.3 °F (36.8 °C). His blood pressure is 108/70 and his pulse is 90. His respiration is 12 and oxygen saturation is 98%.     Chief Complaint: Ear Fullness    Patient presents bilateral ear fullness & tinnitus for the past 2 weeks. Left ear has sharp pains. He has been taking OTC decongestants and Flonase, & states mild relief.      No fever or chills. No known sick contacts. No N/V/D.      Ear Fullness    There is pain in both ears. This is a recurrent problem. The current episode started 1 to 4 weeks ago. The problem occurs constantly. The problem has been gradually worsening. There has been no fever. The pain is at a severity of 5/10. The pain is moderate. Associated symptoms include headaches and a sore throat. Pertinent negatives include no abdominal pain, coughing, diarrhea, ear discharge, hearing loss, neck pain, rash, rhinorrhea or vomiting. Treatments tried: Flonase & Decongestants. The treatment provided mild relief. There is no history of a chronic ear infection, hearing loss or a tympanostomy tube.       Constitution: Negative for chills, sweating, fatigue and fever.   HENT: Positive for ear pain, tinnitus, congestion, postnasal drip and sore throat. Negative for ear discharge, hearing loss, sinus pain, sinus pressure and voice change.    Neck: Negative for neck pain and painful lymph nodes.   Eyes: Negative for eye redness.   Respiratory: Negative for chest tightness, cough, sputum production, bloody sputum, COPD, shortness of breath, stridor, wheezing and asthma.    Gastrointestinal: Negative for abdominal pain, nausea, vomiting and diarrhea.   Musculoskeletal: Negative for muscle ache.   Skin: Negative for rash.   Allergic/Immunologic: Negative for seasonal allergies and asthma.   Neurological: Positive for headaches.   Hematologic/Lymphatic: " Negative for swollen lymph nodes.       Objective:      Physical Exam   Constitutional: He is oriented to person, place, and time. He appears well-developed and well-nourished. He is cooperative.  Non-toxic appearance. He does not appear ill. No distress.   HENT:   Head: Normocephalic and atraumatic.   Right Ear: Hearing, external ear and ear canal normal. A middle ear effusion is present.   Left Ear: Hearing, external ear and ear canal normal. A middle ear effusion is present.   Nose: Mucosal edema and rhinorrhea present. No nasal deformity. No epistaxis. Right sinus exhibits no maxillary sinus tenderness and no frontal sinus tenderness. Left sinus exhibits maxillary sinus tenderness. Left sinus exhibits no frontal sinus tenderness.   Mouth/Throat: Uvula is midline and mucous membranes are normal. No trismus in the jaw. Normal dentition. No uvula swelling. Posterior oropharyngeal erythema present.       Clear fluid effusion     Eyes: Conjunctivae and lids are normal. No scleral icterus.   Sclera clear bilat   Neck: Trachea normal, full passive range of motion without pain and phonation normal. Neck supple.   Cardiovascular: Normal rate, regular rhythm, normal heart sounds, intact distal pulses and normal pulses.   Pulmonary/Chest: Effort normal and breath sounds normal. No stridor. No respiratory distress. He has no decreased breath sounds. He has no wheezes.   Abdominal: Soft. Normal appearance and bowel sounds are normal. He exhibits no distension. There is no tenderness.   Musculoskeletal: Normal range of motion. He exhibits no edema or deformity.   Lymphadenopathy:        Head (right side): Posterior auricular adenopathy present.        Head (left side): Posterior auricular adenopathy present.   Neurological: He is alert and oriented to person, place, and time. He exhibits normal muscle tone. Coordination normal.   Skin: Skin is warm, dry and intact. He is not diaphoretic. No pallor.   Psychiatric: He has a  normal mood and affect. His speech is normal and behavior is normal. Judgment and thought content normal. Cognition and memory are normal.   Nursing note and vitals reviewed.      Assessment:       1. Acute non-recurrent maxillary sinusitis    2. Otalgia of both ears        Plan:         Acute non-recurrent maxillary sinusitis  -     amoxicillin-clavulanate 875-125mg (AUGMENTIN) 875-125 mg per tablet; Take 1 tablet by mouth 2 (two) times daily. for 10 days  Dispense: 20 tablet; Refill: 0    Otalgia of both ears      Patient Instructions     Continue using Flonase twice daily.    Sinusitis (Antibiotic Treatment)    The sinuses are air-filled spaces within the bones of the face. They connect to the inside of the nose. Sinusitis is an inflammation of the tissue lining the sinus cavity. Sinus inflammation can occur during a cold. It can also be due to allergies to pollens and other particles in the air. Sinusitis can cause symptoms of sinus congestion and fullness. A sinus infection causes fever, headache and facial pain. There is often green or yellow drainage from the nose or into the back of the throat (post-nasal drip). You have been given antibiotics to treat this condition.  Home care:  · Take the full course of antibiotics as instructed. Do not stop taking them, even if you feel better.  · Drink plenty of water, hot tea, and other liquids. This may help thin mucus. It also may promote sinus drainage.  · Heat may help soothe painful areas of the face. Use a towel soaked in hot water. Or,  the shower and direct the hot spray onto your face. Using a vaporizer along with a menthol rub at night may also help.   · An expectorant containing guaifenesin may help thin the mucus and promote drainage from the sinuses.  · Over-the-counter decongestants may be used unless a similar medicine was prescribed. Nasal sprays work the fastest. Use one that contains phenylephrine or oxymetazoline. First blow the nose gently.  Then use the spray. Do not use these medicines more often than directed on the label or symptoms may get worse. You may also use tablets containing pseudoephedrine. Avoid products that combine ingredients, because side effects may be increased. Read labels. You can also ask the pharmacist for help. (NOTE: Persons with high blood pressure should not use decongestants. They can raise blood pressure.)  · Over-the-counter antihistamines may help if allergies contributed to your sinusitis.    · Do not use nasal rinses or irrigation during an acute sinus infection, unless told to by your health care provider. Rinsing may spread the infection to other sinuses.  · Use acetaminophen or ibuprofen to control pain, unless another pain medicine was prescribed. (If you have chronic liver or kidney disease or ever had a stomach ulcer, talk with your doctor before using these medicines. Aspirin should never be used in anyone under 18 years of age who is ill with a fever. It may cause severe liver damage.)  · Don't smoke. This can worsen symptoms.  Follow-up care  Follow up with your healthcare provider or our staff if you are not improving within the next week.  When to seek medical advice  Call your healthcare provider if any of these occur:  · Facial pain or headache becoming more severe  · Stiff neck  · Unusual drowsiness or confusion  · Swelling of the forehead or eyelids  · Vision problems, including blurred or double vision  · Fever of 100.4ºF (38ºC) or higher, or as directed by your healthcare provider  · Seizure  · Breathing problems  · Symptoms not resolving within 10 days  Date Last Reviewed: 4/13/2015  © 4540-0500 [employer]. [address] [place] [address], [place] [address]. All rights reserved. This information is not intended as a substitute for professional medical care. Always follow your healthcare professional's instructions.

## 2022-02-16 ENCOUNTER — OFFICE VISIT (OUTPATIENT)
Dept: SLEEP MEDICINE | Facility: CLINIC | Age: 57
End: 2022-02-16
Payer: COMMERCIAL

## 2022-02-16 VITALS
DIASTOLIC BLOOD PRESSURE: 78 MMHG | WEIGHT: 164.44 LBS | HEART RATE: 62 BPM | SYSTOLIC BLOOD PRESSURE: 110 MMHG | BODY MASS INDEX: 24.29 KG/M2

## 2022-02-16 DIAGNOSIS — R06.83 SNORING: ICD-10-CM

## 2022-02-16 DIAGNOSIS — F51.09 OTHER INSOMNIA NOT DUE TO A SUBSTANCE OR KNOWN PHYSIOLOGICAL CONDITION: Primary | ICD-10-CM

## 2022-02-16 PROCEDURE — 3078F PR MOST RECENT DIASTOLIC BLOOD PRESSURE < 80 MM HG: ICD-10-PCS | Mod: CPTII,S$GLB,, | Performed by: INTERNAL MEDICINE

## 2022-02-16 PROCEDURE — 1159F PR MEDICATION LIST DOCUMENTED IN MEDICAL RECORD: ICD-10-PCS | Mod: CPTII,S$GLB,, | Performed by: INTERNAL MEDICINE

## 2022-02-16 PROCEDURE — 99999 PR PBB SHADOW E&M-EST. PATIENT-LVL III: CPT | Mod: PBBFAC,,, | Performed by: INTERNAL MEDICINE

## 2022-02-16 PROCEDURE — 1159F MED LIST DOCD IN RCRD: CPT | Mod: CPTII,S$GLB,, | Performed by: INTERNAL MEDICINE

## 2022-02-16 PROCEDURE — 3074F SYST BP LT 130 MM HG: CPT | Mod: CPTII,S$GLB,, | Performed by: INTERNAL MEDICINE

## 2022-02-16 PROCEDURE — 99999 PR PBB SHADOW E&M-EST. PATIENT-LVL III: ICD-10-PCS | Mod: PBBFAC,,, | Performed by: INTERNAL MEDICINE

## 2022-02-16 PROCEDURE — 99204 OFFICE O/P NEW MOD 45 MIN: CPT | Mod: S$GLB,,, | Performed by: INTERNAL MEDICINE

## 2022-02-16 PROCEDURE — 99204 PR OFFICE/OUTPT VISIT, NEW, LEVL IV, 45-59 MIN: ICD-10-PCS | Mod: S$GLB,,, | Performed by: INTERNAL MEDICINE

## 2022-02-16 PROCEDURE — 3008F PR BODY MASS INDEX (BMI) DOCUMENTED: ICD-10-PCS | Mod: CPTII,S$GLB,, | Performed by: INTERNAL MEDICINE

## 2022-02-16 PROCEDURE — 3078F DIAST BP <80 MM HG: CPT | Mod: CPTII,S$GLB,, | Performed by: INTERNAL MEDICINE

## 2022-02-16 PROCEDURE — 3008F BODY MASS INDEX DOCD: CPT | Mod: CPTII,S$GLB,, | Performed by: INTERNAL MEDICINE

## 2022-02-16 PROCEDURE — 3074F PR MOST RECENT SYSTOLIC BLOOD PRESSURE < 130 MM HG: ICD-10-PCS | Mod: CPTII,S$GLB,, | Performed by: INTERNAL MEDICINE

## 2022-02-16 RX ORDER — TRAZODONE HYDROCHLORIDE 50 MG/1
50 TABLET ORAL NIGHTLY
COMMUNITY
End: 2022-07-13

## 2022-02-16 RX ORDER — ESZOPICLONE 2 MG/1
2 TABLET, FILM COATED ORAL NIGHTLY PRN
Qty: 30 TABLET | Refills: 1 | Status: SHIPPED | OUTPATIENT
Start: 2022-02-16 | End: 2022-04-04 | Stop reason: SDUPTHER

## 2022-02-16 NOTE — PROGRESS NOTES
Referred by Self, Aaareferral     NEW PATIENT VISIT    Alirio Hooper  is a pleasant 57 y.o. male  with PMH significant for GERD, prostatitis who presents today with trouble falling asleep.    SLEEP SCHEDULE   Bed Time 10pm   Sleep Latency 15min -120min   Arousals 1-2   Nocturia 1-2   Back to sleep Minutes to hours   Wake time 6 AM   Naps none   Work        Vitals:    02/16/22 1619   BP: 110/78   BP Location: Right arm   Patient Position: Sitting   BP Method: Medium (Automatic)   Pulse: 62   Weight: 74.6 kg (164 lb 7.4 oz)     Physical Exam:    GEN:   Well-appearing  Psych:  Appropriate affect, demonstrates insight  SKIN:  No rash on the face or bridge of the nose    LABS:   No results found for: HGB, CO2    RECORDS REVIEWED PREVIOUSLY:    No prior sleep testing.    ASSESSMENT    No flowsheet data found.  PROBLEM DESCRIPTION/ Sx on Presentation  STATUS   Insomnia   Good sleep hygiene  Started in November 2021  He had some sleep disruption over a few nights, developed some anxiety  Had rebound insomnia off of clonazepam 0.5mg  CBTi online: tried sleepstation (sleep restriction)  Hypnotics: tried xanax, clonoazepam, mirtazipine  Current:mirtazipine 15mg + trazodone 50mg (hangover)  New   screening WDAIN   + snoring (on his back), + snoring arousals on his back  HEENT: MP1,    New   Daytime Sx   occasional sleepiness when inactive (afternoon dip)  denies sleepiness when driving   ESS 2/24 on intake  New   Nocturia   1-2 times per night  New   Other issues:     PLAN     -discussed sleep testing to screen for DWAIN  -lunesta 2 mg tablets   -ok to continue mirtazipine for daytime anxiety  -discussed trial of PAP therapy if DWAIN present   -driving precautions were discussed with the patient    RTC          The patient was given open opportunity to ask questions and/or express concerns about treatment plan.   All questions/concerns were discussed.     Two patient identifiers used prior to evaluation.

## 2022-02-18 ENCOUNTER — TELEPHONE (OUTPATIENT)
Dept: SLEEP MEDICINE | Facility: OTHER | Age: 57
End: 2022-02-18
Payer: COMMERCIAL

## 2022-02-21 ENCOUNTER — HOSPITAL ENCOUNTER (OUTPATIENT)
Dept: SLEEP MEDICINE | Facility: OTHER | Age: 57
Discharge: HOME | End: 2022-02-21
Attending: INTERNAL MEDICINE
Payer: COMMERCIAL

## 2022-02-21 ENCOUNTER — PATIENT MESSAGE (OUTPATIENT)
Dept: SLEEP MEDICINE | Facility: CLINIC | Age: 57
End: 2022-02-21

## 2022-02-21 DIAGNOSIS — F51.09 OTHER INSOMNIA NOT DUE TO A SUBSTANCE OR KNOWN PHYSIOLOGICAL CONDITION: ICD-10-CM

## 2022-02-21 DIAGNOSIS — R06.83 SNORING: ICD-10-CM

## 2022-02-21 PROCEDURE — 95800 SLP STDY UNATTENDED: CPT

## 2022-02-21 PROCEDURE — 95806 SLEEP STUDY UNATT&RESP EFFT: CPT | Mod: 26,,, | Performed by: INTERNAL MEDICINE

## 2022-02-21 PROCEDURE — 95806 PR SLEEP STUDY, UNATTENDED, SIMUL RECORD HR/O2 SAT/RESP FLOW/RESP EFFT: ICD-10-PCS | Mod: 26,,, | Performed by: INTERNAL MEDICINE

## 2022-02-25 DIAGNOSIS — F41.9 CHRONIC ANXIETY: Primary | ICD-10-CM

## 2022-02-25 RX ORDER — ESCITALOPRAM OXALATE 10 MG/1
10 TABLET ORAL DAILY
Qty: 30 TABLET | Refills: 1 | Status: SHIPPED | OUTPATIENT
Start: 2022-02-25 | End: 2022-04-25

## 2022-03-03 ENCOUNTER — PATIENT MESSAGE (OUTPATIENT)
Dept: SLEEP MEDICINE | Facility: CLINIC | Age: 57
End: 2022-03-03
Payer: COMMERCIAL

## 2022-03-07 ENCOUNTER — TELEPHONE (OUTPATIENT)
Dept: SLEEP MEDICINE | Facility: CLINIC | Age: 57
End: 2022-03-07
Payer: COMMERCIAL

## 2022-03-07 NOTE — TELEPHONE ENCOUNTER
LVM for patient in regards to sleep study results. Sleep study results are also posted on his [facility] portal for him to view.

## 2022-03-08 ENCOUNTER — TELEPHONE (OUTPATIENT)
Dept: SLEEP MEDICINE | Facility: CLINIC | Age: 57
End: 2022-03-08
Payer: COMMERCIAL

## 2022-03-08 NOTE — TELEPHONE ENCOUNTER
----- Message from Ivett Beatty sent at 3/8/2022 10:55 AM CST -----  Regarding: Returning phone call  Contact: KATE NINO [[id number]]  Type: Patient Returning Call        Who Called:KATE NINO [[id number]]        Who Left Message for Patient:Elham Franklin           Does the patient know what this is regarding? Sleep Test Results         Best Call Back Number:[phone]        Additional Information:

## 2022-03-08 NOTE — TELEPHONE ENCOUNTER
Spoke to patient in regards to sleep study results. Informed him of his results and let him know that Dr. Schaffer put in an order for him for a CPAP machine to try. He will wait for a phone call from them.

## 2022-03-10 ENCOUNTER — HOSPITAL ENCOUNTER (EMERGENCY)
Facility: OTHER | Age: 57
Discharge: HOME | End: 2022-03-10
Attending: EMERGENCY MEDICINE
Payer: COMMERCIAL

## 2022-03-10 VITALS
OXYGEN SATURATION: 97 % | HEIGHT: 69 IN | WEIGHT: 160 LBS | BODY MASS INDEX: 23.7 KG/M2 | SYSTOLIC BLOOD PRESSURE: 120 MMHG | TEMPERATURE: 98 F | RESPIRATION RATE: 16 BRPM | DIASTOLIC BLOOD PRESSURE: 83 MMHG | HEART RATE: 58 BPM

## 2022-03-10 DIAGNOSIS — R07.9 CHEST PAIN: ICD-10-CM

## 2022-03-10 DIAGNOSIS — R07.89 ATYPICAL CHEST PAIN: Primary | ICD-10-CM

## 2022-03-10 LAB
ALBUMIN SERPL BCP-MCNC: 4.4 G/DL (ref 3.5–5.2)
ALP SERPL-CCNC: 38 U/L (ref 55–135)
ALT SERPL W/O P-5'-P-CCNC: 20 U/L (ref 10–44)
ANION GAP SERPL CALC-SCNC: 8 MMOL/L (ref 8–16)
AST SERPL-CCNC: 16 U/L (ref 10–40)
BASOPHILS # BLD AUTO: 0.07 K/UL (ref 0–0.2)
BASOPHILS NFR BLD: 1.3 % (ref 0–1.9)
BILIRUB SERPL-MCNC: 0.6 MG/DL (ref 0.1–1)
BNP SERPL-MCNC: 12 PG/ML (ref 0–99)
BUN SERPL-MCNC: 24 MG/DL (ref 6–20)
CALCIUM SERPL-MCNC: 9.7 MG/DL (ref 8.7–10.5)
CHLORIDE SERPL-SCNC: 106 MMOL/L (ref 95–110)
CO2 SERPL-SCNC: 29 MMOL/L (ref 23–29)
CREAT SERPL-MCNC: 1.2 MG/DL (ref 0.5–1.4)
DIFFERENTIAL METHOD: ABNORMAL
EOSINOPHIL # BLD AUTO: 0.1 K/UL (ref 0–0.5)
EOSINOPHIL NFR BLD: 2 % (ref 0–8)
ERYTHROCYTE [DISTWIDTH] IN BLOOD BY AUTOMATED COUNT: 12.1 % (ref 11.5–14.5)
EST. GFR  (AFRICAN AMERICAN): >60 ML/MIN/1.73 M^2
EST. GFR  (NON AFRICAN AMERICAN): >60 ML/MIN/1.73 M^2
GLUCOSE SERPL-MCNC: 90 MG/DL (ref 70–110)
HCT VFR BLD AUTO: 46 % (ref 40–54)
HGB BLD-MCNC: 16 G/DL (ref 14–18)
IMM GRANULOCYTES # BLD AUTO: 0.03 K/UL (ref 0–0.04)
IMM GRANULOCYTES NFR BLD AUTO: 0.6 % (ref 0–0.5)
LYMPHOCYTES # BLD AUTO: 2 K/UL (ref 1–4.8)
LYMPHOCYTES NFR BLD: 36.4 % (ref 18–48)
MAGNESIUM SERPL-MCNC: 2.2 MG/DL (ref 1.6–2.6)
MCH RBC QN AUTO: 32 PG (ref 27–31)
MCHC RBC AUTO-ENTMCNC: 34.8 G/DL (ref 32–36)
MCV RBC AUTO: 92 FL (ref 82–98)
MONOCYTES # BLD AUTO: 0.6 K/UL (ref 0.3–1)
MONOCYTES NFR BLD: 11.1 % (ref 4–15)
NEUTROPHILS # BLD AUTO: 2.6 K/UL (ref 1.8–7.7)
NEUTROPHILS NFR BLD: 48.6 % (ref 38–73)
NRBC BLD-RTO: 0 /100 WBC
PLATELET # BLD AUTO: 242 K/UL (ref 150–450)
PMV BLD AUTO: 10.9 FL (ref 9.2–12.9)
POTASSIUM SERPL-SCNC: 4.3 MMOL/L (ref 3.5–5.1)
PROT SERPL-MCNC: 7.2 G/DL (ref 6–8.4)
RBC # BLD AUTO: 5 M/UL (ref 4.6–6.2)
SODIUM SERPL-SCNC: 143 MMOL/L (ref 136–145)
TROPONIN I SERPL DL<=0.01 NG/ML-MCNC: <0.006 NG/ML (ref 0–0.03)
TROPONIN I SERPL DL<=0.01 NG/ML-MCNC: <0.006 NG/ML (ref 0–0.03)
WBC # BLD AUTO: 5.39 K/UL (ref 3.9–12.7)

## 2022-03-10 PROCEDURE — 93010 ELECTROCARDIOGRAM REPORT: CPT | Mod: ,,, | Performed by: INTERNAL MEDICINE

## 2022-03-10 PROCEDURE — 83735 ASSAY OF MAGNESIUM: CPT | Performed by: PHYSICIAN ASSISTANT

## 2022-03-10 PROCEDURE — 80053 COMPREHEN METABOLIC PANEL: CPT | Performed by: PHYSICIAN ASSISTANT

## 2022-03-10 PROCEDURE — 83880 ASSAY OF NATRIURETIC PEPTIDE: CPT | Performed by: PHYSICIAN ASSISTANT

## 2022-03-10 PROCEDURE — 84484 ASSAY OF TROPONIN QUANT: CPT | Performed by: PHYSICIAN ASSISTANT

## 2022-03-10 PROCEDURE — 85025 COMPLETE CBC W/AUTO DIFF WBC: CPT | Performed by: PHYSICIAN ASSISTANT

## 2022-03-10 PROCEDURE — 99285 EMERGENCY DEPT VISIT HI MDM: CPT | Mod: 25

## 2022-03-10 PROCEDURE — 25000003 PHARM REV CODE 250: Performed by: PHYSICIAN ASSISTANT

## 2022-03-10 PROCEDURE — 93010 EKG 12-LEAD: ICD-10-PCS | Mod: ,,, | Performed by: INTERNAL MEDICINE

## 2022-03-10 PROCEDURE — 93005 ELECTROCARDIOGRAM TRACING: CPT

## 2022-03-10 RX ORDER — ASPIRIN 325 MG
325 TABLET ORAL
Status: COMPLETED | OUTPATIENT
Start: 2022-03-10 | End: 2022-03-10

## 2022-03-10 RX ADMIN — ASPIRIN 325 MG ORAL TABLET 325 MG: 325 PILL ORAL at 03:03

## 2022-03-10 NOTE — ED NOTES
Unable to obtain cardiac leads, none available in unit, call made up to floor to tube some down. Pt remains in NAD.

## 2022-03-10 NOTE — ED TRIAGE NOTES
Arrives c/o intermittent L sided sharp CP, tingling and cold sensations in his fingers and some dizzy episodes x a few days. Started on Propranolol a few months ago. When having CP, describes he feels winded. Has appt with Cardiology later this wk. A&ox3, resp even nonlaboured. Good cap refill, currently denying all sxs.

## 2022-03-10 NOTE — FIRST PROVIDER EVALUATION
Emergency Department TeleTriage Encounter Note      CHIEF COMPLAINT    Chief Complaint   Patient presents with    Hypertension     Pt c.o hypertension onset 2 months.  Pt states chest pain and lightheaded and palpitations on set 2 days ago.  AAO x 3 nadn skin w.d  pt denies n/v. Pt has appt with cardiology on 18th       VITAL SIGNS   Initial Vitals [03/10/22 1345]   BP Pulse Resp Temp SpO2   119/84 70 16 98.4 °F (36.9 °C) 99 %      MAP       --            ALLERGIES    Review of patient's allergies indicates:  No Known Allergies    PROVIDER TRIAGE NOTE  This is a teletriage evaluation of a 57 y.o. male presenting to the ED complaining of fluctuation of his BP, anxiety, insomnia palpitations, x 2 days  He admits to having intermittent left sided chest pain that has now since resolved.     Initial orders will be placed and care will be transferred to an alternate provider when patient is roomed for a full evaluation. Any additional orders and the final disposition will be determined by that provider.           ORDERS  Labs Reviewed   CBC W/ AUTO DIFFERENTIAL   COMPREHENSIVE METABOLIC PANEL   TROPONIN I   B-TYPE NATRIURETIC PEPTIDE       ED Orders (720h ago, onward)    Start Ordered     Status Ordering Provider    03/10/22 1813 03/10/22 1512  Troponin I #2  Once Timed         Ordered REYNALDO TOBARHAN P.    03/10/22 1515 03/10/22 1512  aspirin tablet 325 mg  ED 1 Time         Ordered REYNALDO TOBARHAN P.    03/10/22 1513 03/10/22 1512  Vital signs  Every 15 min         Ordered REYNALDO TOBARHAN P.    03/10/22 1513 03/10/22 1512  Cardiac Monitoring - Adult  Continuous        Comments: Notify Physician If:    Ordered REYNALDO TOBARHAN P.    03/10/22 1513 03/10/22 1512  Pulse Oximetry Continuous  Continuous         Ordered EKATERINA ANDRE P.    03/10/22 1513 03/10/22 1512  Diet NPO  Diet effective now         Ordered EKAETRINA ANDRE P.    03/10/22 1513 03/10/22 1512  Saline lock IV  Once         Ordered REYNALDO TOBARHAN P.     03/10/22 1513 03/10/22 1512  EKG 12-lead  Once        Comments: Do not perform if previously done during this visit/ triage    Ordered ANDRE TOBAR P.    03/10/22 1513 03/10/22 1512  CBC auto differential  STAT         Ordered ANDRE TOBAR P.    03/10/22 1513 03/10/22 1512  Comprehensive metabolic panel  STAT         Ordered ANDRE TOBAR P.    03/10/22 1513 03/10/22 1512  Troponin I #1  STAT         Ordered ANDRE TOBAR P.    03/10/22 1513 03/10/22 1512  B-Type natriuretic peptide (BNP)  STAT         Ordered ANDRE TOBAR P.    03/10/22 1347 03/10/22 1347  EKG 12-lead  Once         Completed by YEISON SZYMANSKI on 3/10/2022 at  2:31 PM [facility]            Virtual Visit Note: The provider triage portion of this emergency department evaluation and documentation was performed via [employer], a HIPAA-compliant telemedicine application, in concert with a tele-presenter in the room. A face to face patient evaluation with one of my colleagues will occur once the patient is placed in an emergency department room.      DISCLAIMER: This note was prepared with [employer] voice recognition transcription software. Garbled syntax, mangled pronouns, and other bizarre constructions may be attributed to that software system.

## 2022-03-10 NOTE — ED PROVIDER NOTES
"Encounter Date: 3/10/2022    SCRIBE #1 NOTE: I, Nilo Norman, am scribing for, and in the presence of,  Gayatri Perkins MD. I have scribed the following portions of the note - Other sections scribed: HPI, ROS, PE.       History     Chief Complaint   Patient presents with    Hypertension     Pt c.o hypertension onset 2 months.  Pt states chest pain and lightheaded and palpitations on set 2 days ago.  AAO x 3 nadn skin w.d  pt denies n/v. Pt has appt with cardiology on 18th     Time seen by provider: 3:45 PM    This is a 57 y.o. male who presents with complaint of two episodes of sharp, left-sided chest pain. He recalls a three to four day history of palpitations causing difficulty sleeping. His palpitations present as a feeling of "hard beating" in his chest. This is accompanied by tingling to the fingers and toes. Patient is unsure if this is a result of feeling anxious while trying to sleep. However, he does note that he is on Lexapro and Lunestra. He does not recall any recent stressors. His first episode of chest pain started yesterday while laying down in bed and lasted several minutes. The second recurred today and lasted several seconds. Each episode is accompanied by feeling flushed. He denies any other symptoms, including any leg swelling. PMHx of HTN on propanolol for the past 2.5 months. His typical blood pressure at home s 95/65, down from 154/104 prior to his medication regimen. He additionally expresses concerns for long haul COVID-19, and has scheduled an appointment at [facility] for evaluation. Patient also has an upcoming appointment with cardiology to discuss the palpitations. He has not had a previous stress test. Denies smoking or family hx of cardiac disease at a young age.      The history is provided by the patient.     Review of patient's allergies indicates:  No Known Allergies  Past Medical History:   Diagnosis Date    GERD (gastroesophageal reflux disease)     Hypertension     Prostatitis  "     Past Surgical History:   Procedure Laterality Date    SHOULDER ARTHROSCOPY       Family History   Problem Relation Age of Onset    Cancer Mother     No Known Problems Father      Social History     Tobacco Use    Smoking status: Never Smoker    Smokeless tobacco: Never Used   Substance Use Topics    Alcohol use: Not Currently     Comment: rarely    Drug use: No     Review of Systems   Constitutional: Negative for fever.   HENT: Negative for sore throat.    Respiratory: Negative for shortness of breath.    Cardiovascular: Positive for chest pain and palpitations. Negative for leg swelling.   Gastrointestinal: Negative for nausea.   Genitourinary: Negative for dysuria.   Musculoskeletal: Negative for back pain.   Skin: Negative for rash.   Neurological: Negative for weakness.   Hematological: Does not bruise/bleed easily.   Psychiatric/Behavioral: The patient is nervous/anxious.        Physical Exam     Initial Vitals [03/10/22 1345]   BP Pulse Resp Temp SpO2   119/84 70 16 98.4 °F (36.9 °C) 99 %      MAP       --         Physical Exam    Nursing note and vitals reviewed.  Constitutional: He appears well-developed and well-nourished. He is not diaphoretic. No distress.   HENT:   Head: Normocephalic and atraumatic.   Eyes: Conjunctivae and EOM are normal. No scleral icterus.   Neck: No JVD present.   Normal range of motion.  Cardiovascular: Normal rate, regular rhythm, normal heart sounds and intact distal pulses.   No murmur heard.  Pulses:       Dorsalis pedis pulses are 2+ on the right side and 2+ on the left side.        Posterior tibial pulses are 2+ on the right side and 2+ on the left side.   Pulmonary/Chest: Effort normal and breath sounds normal. No tachypnea and no bradypnea. No respiratory distress. He has no wheezes. He has no rales.   Musculoskeletal:         General: No tenderness or edema. Normal range of motion.      Cervical back: Normal range of motion.     Neurological: He is alert and  oriented to person, place, and time.   Ambulatory   Skin: Skin is warm and dry. Capillary refill takes less than 2 seconds. No rash noted. No erythema.   Psychiatric: He has a normal mood and affect. Thought content normal.         ED Course   Procedures  Labs Reviewed   CBC W/ AUTO DIFFERENTIAL - Abnormal; Notable for the following components:       Result Value    MCH 32.0 (*)     Immature Granulocytes 0.6 (*)     All other components within normal limits   COMPREHENSIVE METABOLIC PANEL - Abnormal; Notable for the following components:    BUN 24 (*)     Alkaline Phosphatase 38 (*)     All other components within normal limits   TROPONIN I   MAGNESIUM   B-TYPE NATRIURETIC PEPTIDE   TROPONIN I     EKG Readings: (Independently Interpreted)   2:10PM:  Rate of 62. NSR. Normal axis. Normal intervals. No ST or ischemic changes.         Imaging Results          X-Ray Chest AP Portable (In process)               X-Rays:   Independently Interpreted Readings:   Chest X-Ray: Trachea midline. No cardiomegaly. No effusion, infiltrate, or edema.      Medications   aspirin tablet 325 mg (325 mg Oral Given 3/10/22 2804)     Medical Decision Making:   History:   Old Medical Records: I decided to obtain old medical records.  Old Records Summarized: other records and records from clinic visits.  Initial Assessment:   3:45PM:  Patient is a 57-year-old male who presents to the measurement with 2 episodes of atypical chest pain.  Patient appears well, nontoxic.  He is asymptomatic at this time.  He does not have any known risk factors with the exception of blood pressure.  Will plan for labs, cardiac eval, we will continue to follow and reassess.  Independently Interpreted Test(s):   I have ordered and independently interpreted X-rays - see prior notes.  Clinical Tests:   Lab Tests: Ordered and Reviewed  Radiological Study: Ordered and Reviewed  Medical Tests: Ordered and Reviewed    6:48 PM:  Pt doing well, remains stable.  He remains  asymptomatic.  His troponin is negative x2 and EKG is unremarkable.  His heart score would be 2, which would put him at low risk for a major cardiac event in the future.  He does have also a follow-up with Cardiology in the next week.  Given his reassuring workup, I do not feel that further workup in the emergency room is indicated at this time.  I updated the patient regarding the results and counseled the patient regarding supportive care measures.  I have discussed with the pt ED return warnings and need for close PCP f/u.  Pt agreeable to plan and all questions answered.  I feel that pt is stable for discharge and management as an outpatient and no further intervention is needed at this time.  Pt is comfortable returning to the ED if needed.  Will DC home in stable condition.              Scribe Attestation:   Scribe #1: I performed the above scribed service and the documentation accurately describes the services I performed. I attest to the accuracy of the note.               Physician Attestation for Scribe: I, Gayatri Perkins reviewed documentation as scribed in my presence, which is both accurate and complete.    Clinical Impression:   Final diagnoses:  [R07.9] Chest pain                 Gayatri Perkins MD  03/10/22 1923

## 2022-04-01 ENCOUNTER — TELEPHONE (OUTPATIENT)
Dept: SLEEP MEDICINE | Facility: CLINIC | Age: 57
End: 2022-04-01
Payer: COMMERCIAL

## 2022-04-04 ENCOUNTER — PATIENT MESSAGE (OUTPATIENT)
Dept: SLEEP MEDICINE | Facility: CLINIC | Age: 57
End: 2022-04-04
Payer: COMMERCIAL

## 2022-04-04 DIAGNOSIS — F51.09 OTHER INSOMNIA NOT DUE TO A SUBSTANCE OR KNOWN PHYSIOLOGICAL CONDITION: Primary | ICD-10-CM

## 2022-04-04 RX ORDER — ESZOPICLONE 2 MG/1
2 TABLET, FILM COATED ORAL NIGHTLY PRN
Qty: 30 TABLET | Refills: 1 | Status: SHIPPED | OUTPATIENT
Start: 2022-04-04 | End: 2022-05-23 | Stop reason: SDUPTHER

## 2022-04-25 RX ORDER — ESCITALOPRAM OXALATE 10 MG/1
TABLET ORAL
Qty: 30 TABLET | Refills: 5 | Status: SHIPPED | OUTPATIENT
Start: 2022-04-25 | End: 2022-07-13

## 2022-04-29 ENCOUNTER — PATIENT MESSAGE (OUTPATIENT)
Dept: SLEEP MEDICINE | Facility: CLINIC | Age: 57
End: 2022-04-29
Payer: COMMERCIAL

## 2022-04-29 ENCOUNTER — TELEPHONE (OUTPATIENT)
Dept: NEUROLOGY | Facility: CLINIC | Age: 57
End: 2022-04-29
Payer: COMMERCIAL

## 2022-04-29 DIAGNOSIS — G47.33 OSA (OBSTRUCTIVE SLEEP APNEA): Primary | ICD-10-CM

## 2022-04-29 NOTE — TELEPHONE ENCOUNTER
----- Message from Kiarra Wiseman sent at 4/29/2022 10:01 AM CDT -----  Regarding: CPAP status  Name of Who is Calling: Alirio           What is the request in detail: Patient is requesting a call back to get status of CPAP machine.              Can the clinic reply by [facility]:No            [facility]: [phone]

## 2022-05-20 ENCOUNTER — PATIENT MESSAGE (OUTPATIENT)
Dept: SLEEP MEDICINE | Facility: CLINIC | Age: 57
End: 2022-05-20
Payer: COMMERCIAL

## 2022-05-20 DIAGNOSIS — G47.33 OSA (OBSTRUCTIVE SLEEP APNEA): Primary | ICD-10-CM

## 2022-05-23 ENCOUNTER — PATIENT MESSAGE (OUTPATIENT)
Dept: SLEEP MEDICINE | Facility: CLINIC | Age: 57
End: 2022-05-23
Payer: COMMERCIAL

## 2022-05-23 RX ORDER — ESZOPICLONE 2 MG/1
2 TABLET, FILM COATED ORAL NIGHTLY PRN
Qty: 30 TABLET | Refills: 5 | Status: SHIPPED | OUTPATIENT
Start: 2022-05-23 | End: 2022-07-20 | Stop reason: SDUPTHER

## 2022-05-23 NOTE — TELEPHONE ENCOUNTER
Requested Prescriptions     Pending Prescriptions Disp Refills    eszopiclone (LUNESTA) 2 MG Tab 30 tablet 1     Sig: Take 1 tablet (2 mg total) by mouth nightly as needed (insomnia). To avoid risk of rebound insomnia, try to take 3 times a week or less     LOV: 02/16/2022

## 2022-06-27 ENCOUNTER — TELEPHONE (OUTPATIENT)
Dept: NEUROLOGY | Facility: CLINIC | Age: 57
End: 2022-06-27
Payer: COMMERCIAL

## 2022-07-13 ENCOUNTER — OFFICE VISIT (OUTPATIENT)
Dept: SLEEP MEDICINE | Facility: CLINIC | Age: 57
End: 2022-07-13
Payer: COMMERCIAL

## 2022-07-13 ENCOUNTER — TELEPHONE (OUTPATIENT)
Dept: SLEEP MEDICINE | Facility: CLINIC | Age: 57
End: 2022-07-13
Payer: COMMERCIAL

## 2022-07-13 VITALS
BODY MASS INDEX: 24.73 KG/M2 | HEIGHT: 69 IN | SYSTOLIC BLOOD PRESSURE: 129 MMHG | HEART RATE: 68 BPM | WEIGHT: 167 LBS | DIASTOLIC BLOOD PRESSURE: 87 MMHG

## 2022-07-13 DIAGNOSIS — G47.33 OSA (OBSTRUCTIVE SLEEP APNEA): Primary | ICD-10-CM

## 2022-07-13 DIAGNOSIS — F51.09 OTHER INSOMNIA NOT DUE TO A SUBSTANCE OR KNOWN PHYSIOLOGICAL CONDITION: ICD-10-CM

## 2022-07-13 PROCEDURE — 3008F PR BODY MASS INDEX (BMI) DOCUMENTED: ICD-10-PCS | Mod: CPTII,S$GLB,, | Performed by: INTERNAL MEDICINE

## 2022-07-13 PROCEDURE — 99999 PR PBB SHADOW E&M-EST. PATIENT-LVL III: ICD-10-PCS | Mod: PBBFAC,,, | Performed by: INTERNAL MEDICINE

## 2022-07-13 PROCEDURE — 1159F PR MEDICATION LIST DOCUMENTED IN MEDICAL RECORD: ICD-10-PCS | Mod: CPTII,S$GLB,, | Performed by: INTERNAL MEDICINE

## 2022-07-13 PROCEDURE — 99999 PR PBB SHADOW E&M-EST. PATIENT-LVL III: CPT | Mod: PBBFAC,,, | Performed by: INTERNAL MEDICINE

## 2022-07-13 PROCEDURE — 3079F DIAST BP 80-89 MM HG: CPT | Mod: CPTII,S$GLB,, | Performed by: INTERNAL MEDICINE

## 2022-07-13 PROCEDURE — 3074F PR MOST RECENT SYSTOLIC BLOOD PRESSURE < 130 MM HG: ICD-10-PCS | Mod: CPTII,S$GLB,, | Performed by: INTERNAL MEDICINE

## 2022-07-13 PROCEDURE — 1159F MED LIST DOCD IN RCRD: CPT | Mod: CPTII,S$GLB,, | Performed by: INTERNAL MEDICINE

## 2022-07-13 PROCEDURE — 3074F SYST BP LT 130 MM HG: CPT | Mod: CPTII,S$GLB,, | Performed by: INTERNAL MEDICINE

## 2022-07-13 PROCEDURE — 3008F BODY MASS INDEX DOCD: CPT | Mod: CPTII,S$GLB,, | Performed by: INTERNAL MEDICINE

## 2022-07-13 PROCEDURE — 3079F PR MOST RECENT DIASTOLIC BLOOD PRESSURE 80-89 MM HG: ICD-10-PCS | Mod: CPTII,S$GLB,, | Performed by: INTERNAL MEDICINE

## 2022-07-13 PROCEDURE — 99214 PR OFFICE/OUTPT VISIT, EST, LEVL IV, 30-39 MIN: ICD-10-PCS | Mod: S$GLB,,, | Performed by: INTERNAL MEDICINE

## 2022-07-13 PROCEDURE — 99214 OFFICE O/P EST MOD 30 MIN: CPT | Mod: S$GLB,,, | Performed by: INTERNAL MEDICINE

## 2022-07-13 NOTE — PROGRESS NOTES
"ESTABLISHED PATIENT VISIT    Alirio Hooper  is a pleasant 57 y.o. male  with PMH significant for GERD, prostatitis who presents today with trouble falling asleep.    Here today for follow-up of insomnia    PLAN last visit:   -discussed sleep testing to screen for DWAIN  -lunesta 2 mg tablets   -ok to continue mirtazipine for daytime anxiety  -discussed trial of PAP therapy if DWAIN present   -driving precautions were discussed with the patient      Since last visit:   HST 2/21/2022: AHI 8, RDI 12, apneas unscored  2.25.22: remeron to lunesta 2mg?->lexapro 10mg  4.4.22: didn't like lunesta 2 -> remeron (groggy and gained weight)  4.24.22: refill req for lexapro    Not tired during the day    PAP history   Problems    Mask Nasal (some leaking), occasionally wakes him up   Pressure    Benefit    DME HME   Machine age 2022   Download 7.12.22: 27/30 x 5h 7min, 5-12 (7.0/8.8/9.7) Leak 6/18/28, AHI 3.3       SLEEP SCHEDULE   Bed Time 10pm   Sleep Latency 15min -120min   Arousals 1-2   Nocturia 1-2   Back to sleep Minutes to hours   Wake time 6 AM   Naps none   Work        Vitals:    07/13/22 1619   BP: 129/87   BP Location: Left arm   Patient Position: Sitting   BP Method: Medium (Automatic)   Pulse: 68   Weight: 75.8 kg (167 lb)   Height: 5' 9" (1.753 m)     Physical Exam:    GEN:   Well-appearing  Psych:  Appropriate affect, demonstrates insight  SKIN:  No rash on the face or bridge of the nose    LABS:   Lab Results   Component Value Date    HGB 16.0 03/10/2022    CO2 29 03/10/2022       RECORDS REVIEWED PREVIOUSLY:        ASSESSMENT    No flowsheet data found.  PROBLEM DESCRIPTION/ Sx on Presentation Interval Hx STATUS   Insomnia   Good sleep hygiene  Started in November 2021  He had some sleep disruption over a few nights, developed some anxiety  Had rebound insomnia off of clonazepam 0.5mg    CBTi online: tried sleepstation (sleep restriction)  Hypnotics: tried xanax, clonoazepam, mirtazipine  Current: lunesta 2mg Not " sleeping deeply, mostly staying asleep pretty well    lunesta 2mg nightly Improved   Mild DWAIN   + snoring (on his back), + snoring arousals on his back  HEENT: MP1,   Good usage on CPAP improved   Nocturia   1-2 times per night Once per night stable   Daytime Sx occasional sleepiness when inactive (afternoon dip)  denies sleepiness when driving   ESS 2/24 on intake No longer having afternoon dip Some improvement   Other issues: anxiety (started on wellbutrin)    PLAN       -consider CPAP titration which he will consider  -continue lunesta 2mg prn   -he has tried CBTi  -the patient is using and benefiting from PAP therapy      RTC          The patient was given open opportunity to ask questions and/or express concerns about treatment plan.   All questions/concerns were discussed.     Two patient identifiers used prior to evaluation.

## 2022-09-16 ENCOUNTER — PATIENT MESSAGE (OUTPATIENT)
Dept: SLEEP MEDICINE | Facility: CLINIC | Age: 57
End: 2022-09-16
Payer: COMMERCIAL

## 2022-11-27 ENCOUNTER — OFFICE VISIT (OUTPATIENT)
Dept: URGENT CARE | Facility: CLINIC | Age: 57
End: 2022-11-27
Payer: COMMERCIAL

## 2022-11-27 VITALS
HEIGHT: 69 IN | HEART RATE: 74 BPM | DIASTOLIC BLOOD PRESSURE: 83 MMHG | BODY MASS INDEX: 25.92 KG/M2 | WEIGHT: 175 LBS | SYSTOLIC BLOOD PRESSURE: 116 MMHG | RESPIRATION RATE: 16 BRPM | TEMPERATURE: 100 F | OXYGEN SATURATION: 98 %

## 2022-11-27 DIAGNOSIS — R52 BODY ACHES: Primary | ICD-10-CM

## 2022-11-27 DIAGNOSIS — R09.81 NASAL CONGESTION: ICD-10-CM

## 2022-11-27 DIAGNOSIS — R53.83 FATIGUE, UNSPECIFIED TYPE: ICD-10-CM

## 2022-11-27 DIAGNOSIS — R05.1 ACUTE COUGH: ICD-10-CM

## 2022-11-27 DIAGNOSIS — R09.81 SINUS CONGESTION: ICD-10-CM

## 2022-11-27 PROBLEM — E78.00 PURE HYPERCHOLESTEROLEMIA: Status: ACTIVE | Noted: 2022-04-29

## 2022-11-27 PROBLEM — G47.33 OSA (OBSTRUCTIVE SLEEP APNEA): Status: ACTIVE | Noted: 2022-04-29

## 2022-11-27 PROBLEM — R25.2 SPASM: Status: ACTIVE | Noted: 2020-08-29

## 2022-11-27 PROBLEM — M54.50 LOW BACK PAIN: Status: ACTIVE | Noted: 2020-09-01

## 2022-11-27 PROBLEM — R00.2 PALPITATIONS: Status: ACTIVE | Noted: 2022-03-18

## 2022-11-27 PROBLEM — N42.89 OTHER SPECIFIED DISORDERS OF PROSTATE: Status: ACTIVE | Noted: 2022-11-27

## 2022-11-27 PROBLEM — R35.0 INCREASED FREQUENCY OF URINATION: Status: ACTIVE | Noted: 2021-03-16

## 2022-11-27 LAB
CTP QC/QA: YES
CTP QC/QA: YES
POC MOLECULAR INFLUENZA A AGN: NEGATIVE
POC MOLECULAR INFLUENZA B AGN: NEGATIVE
SARS-COV-2 AG RESP QL IA.RAPID: NEGATIVE

## 2022-11-27 PROCEDURE — 1159F MED LIST DOCD IN RCRD: CPT | Mod: CPTII,S$GLB,, | Performed by: NURSE PRACTITIONER

## 2022-11-27 PROCEDURE — 87502 INFLUENZA DNA AMP PROBE: CPT | Mod: QW,S$GLB,, | Performed by: NURSE PRACTITIONER

## 2022-11-27 PROCEDURE — 1160F PR REVIEW ALL MEDS BY PRESCRIBER/CLIN PHARMACIST DOCUMENTED: ICD-10-PCS | Mod: CPTII,S$GLB,, | Performed by: NURSE PRACTITIONER

## 2022-11-27 PROCEDURE — 3008F PR BODY MASS INDEX (BMI) DOCUMENTED: ICD-10-PCS | Mod: CPTII,S$GLB,, | Performed by: NURSE PRACTITIONER

## 2022-11-27 PROCEDURE — 3074F SYST BP LT 130 MM HG: CPT | Mod: CPTII,S$GLB,, | Performed by: NURSE PRACTITIONER

## 2022-11-27 PROCEDURE — 1159F PR MEDICATION LIST DOCUMENTED IN MEDICAL RECORD: ICD-10-PCS | Mod: CPTII,S$GLB,, | Performed by: NURSE PRACTITIONER

## 2022-11-27 PROCEDURE — 1160F RVW MEDS BY RX/DR IN RCRD: CPT | Mod: CPTII,S$GLB,, | Performed by: NURSE PRACTITIONER

## 2022-11-27 PROCEDURE — 3079F DIAST BP 80-89 MM HG: CPT | Mod: CPTII,S$GLB,, | Performed by: NURSE PRACTITIONER

## 2022-11-27 PROCEDURE — 87811 SARS-COV-2 COVID19 W/OPTIC: CPT | Mod: QW,S$GLB,, | Performed by: NURSE PRACTITIONER

## 2022-11-27 PROCEDURE — 99213 OFFICE O/P EST LOW 20 MIN: CPT | Mod: S$GLB,,, | Performed by: NURSE PRACTITIONER

## 2022-11-27 PROCEDURE — 87811 SARS CORONAVIRUS 2 ANTIGEN POCT, MANUAL READ: ICD-10-PCS | Mod: QW,S$GLB,, | Performed by: NURSE PRACTITIONER

## 2022-11-27 PROCEDURE — 3079F PR MOST RECENT DIASTOLIC BLOOD PRESSURE 80-89 MM HG: ICD-10-PCS | Mod: CPTII,S$GLB,, | Performed by: NURSE PRACTITIONER

## 2022-11-27 PROCEDURE — 87502 POCT INFLUENZA A/B MOLECULAR: ICD-10-PCS | Mod: QW,S$GLB,, | Performed by: NURSE PRACTITIONER

## 2022-11-27 PROCEDURE — 99213 PR OFFICE/OUTPT VISIT, EST, LEVL III, 20-29 MIN: ICD-10-PCS | Mod: S$GLB,,, | Performed by: NURSE PRACTITIONER

## 2022-11-27 PROCEDURE — 3074F PR MOST RECENT SYSTOLIC BLOOD PRESSURE < 130 MM HG: ICD-10-PCS | Mod: CPTII,S$GLB,, | Performed by: NURSE PRACTITIONER

## 2022-11-27 PROCEDURE — 3008F BODY MASS INDEX DOCD: CPT | Mod: CPTII,S$GLB,, | Performed by: NURSE PRACTITIONER

## 2022-11-27 RX ORDER — BUSPIRONE HYDROCHLORIDE 5 MG/1
5 TABLET ORAL 2 TIMES DAILY
COMMUNITY
Start: 2022-10-03

## 2022-11-27 RX ORDER — BENZONATATE 100 MG/1
100 CAPSULE ORAL 3 TIMES DAILY PRN
Qty: 30 CAPSULE | Refills: 0 | Status: SHIPPED | OUTPATIENT
Start: 2022-11-27 | End: 2022-12-07

## 2022-11-27 RX ORDER — FLUTICASONE PROPIONATE 50 MCG
2 SPRAY, SUSPENSION (ML) NASAL DAILY PRN
Qty: 15.8 ML | Refills: 0 | Status: SHIPPED | OUTPATIENT
Start: 2022-11-27 | End: 2023-08-03

## 2022-11-27 RX ORDER — BUPROPION HYDROCHLORIDE 150 MG/1
150 TABLET ORAL EVERY MORNING
COMMUNITY
Start: 2022-08-29 | End: 2023-08-03

## 2022-11-27 RX ORDER — ATORVASTATIN CALCIUM 40 MG/1
40 TABLET, FILM COATED ORAL
COMMUNITY
Start: 2022-11-04

## 2022-11-27 RX ORDER — MIRTAZAPINE 15 MG/1
15 TABLET, FILM COATED ORAL NIGHTLY
COMMUNITY
Start: 2022-11-04

## 2022-11-27 NOTE — PROGRESS NOTES
"Subjective:       Patient ID: Alirio Hooper is a 57 y.o. male.    Vitals:  height is 5' 9" (1.753 m) and weight is 79.4 kg (175 lb). His temperature is 99.8 °F (37.7 °C). His blood pressure is 116/83 and his pulse is 74. His respiration is 16 and oxygen saturation is 98%.     Chief Complaint: Nasal Congestion (Mild cough, aches. - Entered by patient) and Generalized Body Aches    Patient reports fatigue, body aches, sinus/nasal congestion, and slight cough since yesterday. Patient took at home covid test which was negative. 0/10 pain. He was recently around family who were positive for upper respiratory infection. Patient took mucinex and ibuprofen this morning with moderate relief. Patient requesting flu and covid testing,.       57-year-old male presents to clinic with complaints of fatigue, body ache, sinus and nasal congestion, cough. History positive for Pfizer covid vaccine x 1 dose 11/8/21, Moderna covid vaccine x 4 doses 5/21/22, 4/8/21, 3/5/21, bivalent booster 10/23/22; influenza vaccine 10/22/22; sleep apnea. Home covid test with negative results. Treating with Mucinex and ibuprofen.     Fatigue  This is a new problem. The current episode started yesterday. The problem occurs constantly. The problem has been unchanged. Associated symptoms include congestion, coughing, fatigue and myalgias. Pertinent negatives include no sore throat. Nothing aggravates the symptoms. He has tried nothing for the symptoms. The treatment provided no relief.     Constitution: Positive for fatigue.   HENT:  Positive for congestion and postnasal drip. Negative for sore throat.    Respiratory:  Positive for cough.    Musculoskeletal:  Positive for muscle ache.     Objective:      Physical Exam   Constitutional: He is oriented to person, place, and time. He appears well-developed. He is cooperative.  Non-toxic appearance. He does not appear ill. No distress.   HENT:   Head: Normocephalic and atraumatic.   Ears:   Right Ear: " Hearing, external ear and ear canal normal. Tympanic membrane is bulging.   Left Ear: Hearing, external ear and ear canal normal. Tympanic membrane is bulging.   Nose: Mucosal edema and rhinorrhea present. No nasal deformity. No epistaxis. Right sinus exhibits no maxillary sinus tenderness and no frontal sinus tenderness. Left sinus exhibits no maxillary sinus tenderness and no frontal sinus tenderness.   Mouth/Throat: Uvula is midline, oropharynx is clear and moist and mucous membranes are normal. No trismus in the jaw. Normal dentition. No uvula swelling. No oropharyngeal exudate, posterior oropharyngeal edema or posterior oropharyngeal erythema.   Eyes: Conjunctivae and lids are normal. No scleral icterus.   Neck: Trachea normal and phonation normal. Neck supple. No edema present. No erythema present. No neck rigidity present.   Cardiovascular: Normal rate, regular rhythm, normal heart sounds and normal pulses.   Pulmonary/Chest: Effort normal and breath sounds normal. No respiratory distress. He has no decreased breath sounds. He has no rhonchi.   Non-productive cough present         Comments: Non-productive cough present    Abdominal: Normal appearance.   Musculoskeletal: Normal range of motion.         General: No deformity. Normal range of motion.   Neurological: He is alert and oriented to person, place, and time. He exhibits normal muscle tone. Coordination normal.   Skin: Skin is warm, dry, intact, not diaphoretic and not pale.   Psychiatric: His speech is normal and behavior is normal. Judgment and thought content normal.   Nursing note and vitals reviewed.      Assessment:       1. Body aches    2. Fatigue, unspecified type    3. Sinus congestion    4. Nasal congestion    5. Acute cough        Results for orders placed or performed in visit on 11/27/22   SARS Coronavirus 2 Antigen, POCT Manual Read   Result Value Ref Range    SARS Coronavirus 2 Antigen Negative Negative     Acceptable Yes     POCT Influenza A/B MOLECULAR   Result Value Ref Range    POC Molecular Influenza A Ag Negative Negative, Not Reported    POC Molecular Influenza B Ag Negative Negative, Not Reported     Acceptable Yes       Plan:         Body aches  -     SARS Coronavirus 2 Antigen, POCT Manual Read  -     POCT Influenza A/B MOLECULAR    Fatigue, unspecified type    Sinus congestion    Nasal congestion    Acute cough       Patient Instructions   Reviewed negative COVID-19 virus and influenza test with patient who verbalized understanding.  Advised patient that his symptoms are indicative of an upper respiratory infection which is viral in nature and should be treated symptomatically.  We discussed over-the-counter medications as well as home remedies to help with current symptoms.   Patient educational handouts also included in discharge paperwork for patient who verbalized understanding agrees with plan of care.  He denies any further questions or concerns at this time.  Patient exits exam room in no acute distress.     Please drink plenty of fluids.  Please get plenty of rest.  We recommend you take Flonase (Fluticasone) or another nasally inhaled steroid unless you are already taking one.  Nasal irrigation with a saline spray or Netti Pot like device per their directions is also recommended.  If not allergic, please take over the counter Tylenol (Acetaminophen) and/or Motrin (Ibuprofen) as directed for control of pain and/or fever.  Please follow up with your primary care doctor or specialist as needed.  Please return here or go to the Emergency Department for any concerns or worsening of condition.  If you  smoke, please stop smoking.

## 2022-11-27 NOTE — PATIENT INSTRUCTIONS
Reviewed negative COVID-19 virus and influenza test with patient who verbalized understanding.  Advised patient that his symptoms are indicative of an upper respiratory infection which is viral in nature and should be treated symptomatically.  We discussed over-the-counter medications as well as home remedies to help with current symptoms.   Patient educational handouts also included in discharge paperwork for patient who verbalized understanding agrees with plan of care.  He denies any further questions or concerns at this time.  Patient exits exam room in no acute distress.     Please drink plenty of fluids.  Please get plenty of rest.  We recommend you take Flonase (Fluticasone) or another nasally inhaled steroid unless you are already taking one.  Nasal irrigation with a saline spray or Netti Pot like device per their directions is also recommended.  If not allergic, please take over the counter Tylenol (Acetaminophen) and/or Motrin (Ibuprofen) as directed for control of pain and/or fever.  Please follow up with your primary care doctor or specialist as needed.  Please return here or go to the Emergency Department for any concerns or worsening of condition.  If you  smoke, please stop smoking.

## 2022-12-05 ENCOUNTER — PATIENT MESSAGE (OUTPATIENT)
Dept: SLEEP MEDICINE | Facility: CLINIC | Age: 57
End: 2022-12-05
Payer: COMMERCIAL

## 2022-12-06 DIAGNOSIS — F51.09 OTHER INSOMNIA NOT DUE TO A SUBSTANCE OR KNOWN PHYSIOLOGICAL CONDITION: Primary | ICD-10-CM

## 2022-12-16 ENCOUNTER — PATIENT MESSAGE (OUTPATIENT)
Dept: SLEEP MEDICINE | Facility: CLINIC | Age: 57
End: 2022-12-16
Payer: COMMERCIAL

## 2022-12-16 DIAGNOSIS — F51.09 OTHER INSOMNIA NOT DUE TO A SUBSTANCE OR KNOWN PHYSIOLOGICAL CONDITION: Primary | ICD-10-CM

## 2022-12-16 RX ORDER — ESZOPICLONE 2 MG/1
2 TABLET, FILM COATED ORAL NIGHTLY PRN
Qty: 30 TABLET | Refills: 5 | Status: SHIPPED | OUTPATIENT
Start: 2022-12-16 | End: 2022-12-19 | Stop reason: SDUPTHER

## 2022-12-19 ENCOUNTER — PATIENT MESSAGE (OUTPATIENT)
Dept: SLEEP MEDICINE | Facility: CLINIC | Age: 57
End: 2022-12-19
Payer: COMMERCIAL

## 2022-12-19 RX ORDER — ESZOPICLONE 2 MG/1
2 TABLET, FILM COATED ORAL NIGHTLY PRN
Qty: 30 TABLET | Refills: 5 | Status: SHIPPED | OUTPATIENT
Start: 2022-12-19 | End: 2023-08-03 | Stop reason: SDUPTHER

## 2023-01-23 ENCOUNTER — TELEPHONE (OUTPATIENT)
Dept: SLEEP MEDICINE | Facility: CLINIC | Age: 58
End: 2023-01-23
Payer: COMMERCIAL

## 2023-01-23 NOTE — TELEPHONE ENCOUNTER
----- Message from Pepe Schaffer MD sent at 1/23/2023  6:43 AM CST -----  Regarding: FW: has P2P been scheduled?  Hi. Did we ever hear back about this one either?  Thanks!  ----- Message -----  From: Pepe Schaffer MD  Sent: 1/16/2023  12:00 AM CST  To: Pepe Schaffer MD  Subject: has P2P been scheduled?                            ----- Message -----  From: Pepe Schaffer MD  Sent: 1/10/2023  12:25 PM CST  To: Pepe Schaffer MD  Subject: Has P2P been set up yet?

## 2023-01-23 NOTE — TELEPHONE ENCOUNTER
Staff reached out to Daria @[facility] to schedule a P2P for the pt's sleep study. Daria did not answer, so I left a message for her to contact me back.

## 2023-02-27 NOTE — PROGRESS NOTES
"ESTABLISHED PATIENT VISIT    Alirio Hooper  is a pleasant 58 y.o. male  with PMH significant for GERD, prostatitis who presents today with trouble falling asleep.    Here today for follow-up of insomnia    PLAN last visit:   -consider CPAP titration which he will consider  -continue lunesta 2mg prn   -he has tried CBTi  -the patient is using and benefiting from PAP therapy    Since last visit:     He has fallen out of the habit of wearing CPAP since [place]  Had some leaking with the mask  Falling asleep easily    PAP history   Problems    Mask Nasal pillows  Nasal (some leaking),   occasionally wakes him up   Pressure    Benefit    DME HME   Machine age 2022   Download 2/26/23: minimal usage       SLEEP SCHEDULE   Bed Time 10pm   Sleep Latency 15min -120min   Arousals 1-2   Nocturia 1-2   Back to sleep Minutes to hours   Wake time 6 AM   Naps none   Work        Vitals:    02/28/23 0942   BP: 121/82   Pulse: 71   Weight: 80 kg (176 lb 5.9 oz)   Height: 5' 9" (1.753 m)       Physical Exam:    GEN:   Well-appearing  Psych:  Appropriate affect, demonstrates insight  SKIN:  No rash on the face or bridge of the nose    LABS:   No results found for: HGB, CO2      RECORDS REVIEWED PREVIOUSLY:  HST 2/21/2022: AHI 8, RDI 12, apneas unscored  7.12.22: 27/30 x 5h 7min, 5-12 (7.0/8.8/9.7) Leak 6/18/28, AHI 3.3  7.21.22: 29/30 x 5h 55min 5-12 (7.2/9.1/10.1), Leak 6/20/31, AHI 3.5 (c 2.3)    ASSESSMENT    No flowsheet data found.  PROBLEM DESCRIPTION/ Sx on Presentation Interval Hx STATUS   Insomnia   Good sleep hygiene  Started in November 2021  He had some sleep disruption over a few nights, developed some anxiety  Had rebound insomnia off of clonazepam 0.5mg    PAP: some improvement  CBTi online: tried sleepstation (sleep restriction)  Hypnotics:   Prior: xanax, clonoazepam, mirtazipine, lunesta (groggy)  Current: lunesta 2mg, remeron 15 mg Less anxious about his sleep currently    Interested in weaning off of lunesta " stable   Mild DWAIN   + snoring (on his back), + snoring arousals on his back  HEENT: MP1,    PAP: good usage and efficacy Has fallen out of the habit of wearing CPAP uncontrolled   Nocturia   1-2 times per night  PAP: once per night About once stable   Daytime Sx occasional sleepiness when inactive (afternoon dip)  denies sleepiness when driving   ESS 2/24 on intake  PAP: no longer having afternoon dip Only occasional afternoon sleepiness N/a   Other issues: anxiety (started on wellbutrin)    PLAN       -patient needs CPAP titration due to CPAP intolerance  -discussed weaning off of lunesta - he would like to decrease to 1mg and then off over the course of several weeks  -continue remeron 15mg   -consider repeating CBTi in the future which he will consider  -the patient is using and benefiting from PAP therapy        RTC          The patient was given open opportunity to ask questions and/or express concerns about treatment plan.   All questions/concerns were discussed.     Two patient identifiers used prior to evaluation.

## 2023-02-28 ENCOUNTER — OFFICE VISIT (OUTPATIENT)
Dept: SLEEP MEDICINE | Facility: CLINIC | Age: 58
End: 2023-02-28
Payer: COMMERCIAL

## 2023-02-28 VITALS
BODY MASS INDEX: 26.12 KG/M2 | HEIGHT: 69 IN | DIASTOLIC BLOOD PRESSURE: 82 MMHG | WEIGHT: 176.38 LBS | HEART RATE: 71 BPM | SYSTOLIC BLOOD PRESSURE: 121 MMHG

## 2023-02-28 DIAGNOSIS — G47.33 OSA (OBSTRUCTIVE SLEEP APNEA): Primary | ICD-10-CM

## 2023-02-28 DIAGNOSIS — F51.09 OTHER INSOMNIA NOT DUE TO A SUBSTANCE OR KNOWN PHYSIOLOGICAL CONDITION: ICD-10-CM

## 2023-02-28 PROCEDURE — 3074F SYST BP LT 130 MM HG: CPT | Mod: CPTII,S$GLB,, | Performed by: INTERNAL MEDICINE

## 2023-02-28 PROCEDURE — 1159F MED LIST DOCD IN RCRD: CPT | Mod: CPTII,S$GLB,, | Performed by: INTERNAL MEDICINE

## 2023-02-28 PROCEDURE — 99214 PR OFFICE/OUTPT VISIT, EST, LEVL IV, 30-39 MIN: ICD-10-PCS | Mod: S$GLB,,, | Performed by: INTERNAL MEDICINE

## 2023-02-28 PROCEDURE — 99999 PR PBB SHADOW E&M-EST. PATIENT-LVL III: CPT | Mod: PBBFAC,,, | Performed by: INTERNAL MEDICINE

## 2023-02-28 PROCEDURE — 1159F PR MEDICATION LIST DOCUMENTED IN MEDICAL RECORD: ICD-10-PCS | Mod: CPTII,S$GLB,, | Performed by: INTERNAL MEDICINE

## 2023-02-28 PROCEDURE — 3008F PR BODY MASS INDEX (BMI) DOCUMENTED: ICD-10-PCS | Mod: CPTII,S$GLB,, | Performed by: INTERNAL MEDICINE

## 2023-02-28 PROCEDURE — 3079F DIAST BP 80-89 MM HG: CPT | Mod: CPTII,S$GLB,, | Performed by: INTERNAL MEDICINE

## 2023-02-28 PROCEDURE — 3074F PR MOST RECENT SYSTOLIC BLOOD PRESSURE < 130 MM HG: ICD-10-PCS | Mod: CPTII,S$GLB,, | Performed by: INTERNAL MEDICINE

## 2023-02-28 PROCEDURE — 99214 OFFICE O/P EST MOD 30 MIN: CPT | Mod: S$GLB,,, | Performed by: INTERNAL MEDICINE

## 2023-02-28 PROCEDURE — 99999 PR PBB SHADOW E&M-EST. PATIENT-LVL III: ICD-10-PCS | Mod: PBBFAC,,, | Performed by: INTERNAL MEDICINE

## 2023-02-28 PROCEDURE — 3008F BODY MASS INDEX DOCD: CPT | Mod: CPTII,S$GLB,, | Performed by: INTERNAL MEDICINE

## 2023-02-28 PROCEDURE — 3079F PR MOST RECENT DIASTOLIC BLOOD PRESSURE 80-89 MM HG: ICD-10-PCS | Mod: CPTII,S$GLB,, | Performed by: INTERNAL MEDICINE

## 2023-03-10 ENCOUNTER — TELEPHONE (OUTPATIENT)
Dept: SLEEP MEDICINE | Facility: OTHER | Age: 58
End: 2023-03-10
Payer: COMMERCIAL

## 2023-03-24 ENCOUNTER — TELEPHONE (OUTPATIENT)
Dept: SLEEP MEDICINE | Facility: OTHER | Age: 58
End: 2023-03-24
Payer: COMMERCIAL

## 2023-04-14 ENCOUNTER — HOSPITAL ENCOUNTER (OUTPATIENT)
Dept: SLEEP MEDICINE | Facility: OTHER | Age: 58
Discharge: HOME | End: 2023-04-14
Attending: INTERNAL MEDICINE
Payer: COMMERCIAL

## 2023-04-14 ENCOUNTER — TELEPHONE (OUTPATIENT)
Dept: SLEEP MEDICINE | Facility: OTHER | Age: 58
End: 2023-04-14
Payer: COMMERCIAL

## 2023-04-14 DIAGNOSIS — F51.09 OTHER INSOMNIA NOT DUE TO A SUBSTANCE OR KNOWN PHYSIOLOGICAL CONDITION: ICD-10-CM

## 2023-04-14 DIAGNOSIS — G47.33 OSA (OBSTRUCTIVE SLEEP APNEA): ICD-10-CM

## 2023-04-14 PROCEDURE — 95811 POLYSOM 6/>YRS CPAP 4/> PARM: CPT

## 2023-04-15 NOTE — PROGRESS NOTES
Pt set up, education, procedures explained prior to testing. Disposable leads/sensors used where applicable. Titration study performed. Patient used his own mask for duration of testing (griffin fx pillows, med) Post study follow up instructions given in the AM, questions answered.

## 2023-04-26 ENCOUNTER — PATIENT MESSAGE (OUTPATIENT)
Dept: SLEEP MEDICINE | Facility: CLINIC | Age: 58
End: 2023-04-26

## 2023-04-26 PROCEDURE — 95811 PR POLYSOMNOGRAPHY W/CPAP: ICD-10-PCS | Mod: 26,,, | Performed by: INTERNAL MEDICINE

## 2023-04-26 PROCEDURE — 95811 POLYSOM 6/>YRS CPAP 4/> PARM: CPT | Mod: 26,,, | Performed by: INTERNAL MEDICINE

## 2023-04-26 NOTE — PROCEDURES
"[facility]    Titration Interpretation Report    Patient Name:  KATE NINO  MRN#:  [id number]  :  1965  Study Date:  2023  Referring Provider:  MD Bryan    The patient is a 58 year old Male who is 5' 9" and weighs 176.0 lbs.  His BMI equals 26.1.  A full night PAP titration was performed.    Polysomnogram Data  A full night polysomnogram recorded the standard physiologic parameters including EEG, EOG, EMG, EKG, nasal and oral airflow.  Respiratory parameters of chest and abdominal movements were recorded with [place]Crystal motion transducers.  Oxygen saturation was recorded by pulse oximetry.    Titration Summary  The patient was titrated at pressures ranging from 5* cm/H20 with supplemental oxygen at - up to 7* cm/H20 with supplemental oxygen at -.  The last pressure used in the study was 7* cm/H20 with supplemental oxygen at -.    Sleep Architecture  The total recording time of the polysomnogram was 409.6 minutes.  The total sleep time was 302.5 minutes.  The patient spent 21.8% of total sleep time in Stage N1, 65.8% in Stage N2, 0.7% in Stages N3, and 11.7% in REM.  Sleep latency was 4.0 minutes.  REM latency was 253.5 minutes.  Sleep Efficiency was 73.8%.  Total wake time was 107.5 minutes for a total wake percentage of 24.2%.  Wake after Sleep Onset was 103.0 minutes.    Respiratory Summary  The polysomnogram revealed a presence of 2 obstructive, 4 central, and - mixed apneas resulting in Total Apnea index of 1.2 events per hour.  There were 8 hypopneas resulting in Total Hypopnea index of 1.6 events per hour.  The combined Apnea/Hypopnea index was 2.8 events per hour.  There were a total of - RERA events resulting in a Respiratory Disturbance Index (RDI) of 2.8 events per hour.     Mean oxygen saturation was 93.8%.  The lowest oxygen saturation during sleep was 86.0%.  Time spent ?88% oxygen saturation was 0.8 minutes (0.2%).    Limb Movement Activity  There were - limb " "movements recorded.      Cardiac: single lead EKG [id number] normal sinus rhythm     PAP titration:    Mask used in the study: griffin fx pillows, medium  PAP = 5 cwp was effective in supine n2 sleep.  PAP = 7 cwp was only partially effective in supine REM sleep.    Oxygenation:  At therapeutic levels of PAP therapy, there was no baseline hypoxemia.    Impression:  -obstructive sleep apnea     Recommendations:    -auto-CPAP with CPAP min = 8 cwp  and CPAP max =  12 cwp is recommended  -the patient has follow up with Sleep Medicine        Pepe Schaffer MD    (This Sleep Study was interpreted by a Board Certified Sleep Specialist who conducted an epoch-by-epoch review of the entire raw data recording.)  (The indication for this sleep study was reviewed and deemed appropriate by AASM Practice Parameters or other reasons by a Board Certified Sleep Specialist.)    [facility]    Titration Report    Patient Name: KATE NINO Study Date: 4/14/2023   YOB: 1965 MRN #: [id number]   Age: 58 year SUSAN #: -   Sex: Male Referring Provider: -   Height: 5' 9" Recording Tech: Daya Mullen RPSGT   Weight: 176.0 lbs Scoring Tech: Chaz Vidal RRT RPSGT   BMI: 26.1 Interpreting Physician: -   ESS: - Neck Circumference: -     Study Overview    Lights Off: 10:23:14 PM  Count Index   Lights On: 05:12:52 AM Awakenings: 40 7.9   Time in Bed: 409.6 min. Arousals: 29 5.8   Total Sleep Time: 302.5 min. Apneas & Hypopneas: 14 2.8    Sleep Efficiency: 73.8% Limb Movements: - -   Sleep Latency: 4.0 min. Snores: - -   Wake After Sleep Onset: 103.0 min. Desaturations: 13 2.6    REM Latency from Sleep Onset: 253.5 min. Minimum SpO2 TST: 86.0%      Sleep Architecture   % of Time in Bed  Stages Time (mins) % Sleep Time   Wake 107.5    Stage N1 66.0 21.8%   Stage N2 199.0 65.8%   Stage N3 2.0 0.7%   REM 35.5 11.7%         Arousal Summary     NREM REM Sleep Index   Respiratory Arousals 2 1 3 0.6   PLM Arousals - - - - "   Isolated Limb Movement Arousals - - - -   Spontaneous Arousals 21 5 26 5.2   Total 23 6 29 5.8       Limb Movement Summary     Count Index   Isolated Limb Movements - -   Periodic Limb Movements (PLMs) - -   Total Limb Movements - -       Respiratory Summary     By Sleep Stage By Body Position Total    NREM REM Supine Non-Supine    Time (min) 267.0 35.5 136.5 166.0 302.5           Obstructive Apnea 1 1 2 - 2   Mixed Apnea - - - - -   Central Apnea 2 2 4 - 4   Total Apneas 3 3 6 - 6   Total Apnea Index 0.7 5.1 2.6 - 1.2           Total Hypopnea 4 4 8 - 8   Total Hypopnea Index 0.9 6.8 3.5 - 1.6           Apnea & Hypopnea 7 7 14 - 14   Apnea & Hypopnea Index 1.6 11.8 6.2 - 2.8           RERAs - - - - -   RERA Index - - - - -           RDI 1.6 11.8 6.2 - 2.8     Scoring Criteria: Hypopneas scored at 3% desaturation criteria.    Respiratory Event Durations     Apnea Hypopnea    NREM REM NREM REM   Average (seconds) 19.6 16.7 17.6 16.9   Maximum (seconds) 31.0 27.0 20.3 19.2       Oxygen Saturation Summary     Wake NREM REM TST Total   Average SpO2 94.3% 93.6% 93.4% 93.6% 93.8%   Minimum SpO2 91.0% 86.0% 86.0% 86.0% 86.0%   Maximum SpO2 98.0% 97.0% 96.0% 97.0% 98.0%     Oxygen Saturation Distribution    Range (%) Time in range (min) Time in range (%)    90.0 - 100.0 404.6 99.7%   80.0 - 90.0 1.4 0.3%   70.0 - 80.0 - -   60.0 - 70.0 - -   50.0 - 60.0 - -   0.0 - 50.0 - -   Time Spent ?88% SpO2    Range (%) Time in range (min) Time in range (%)   0.0 - 88.0 0.8 0.2%          Count Index   Desaturations 13 2.6      Cardiac Summary     Wake NREM REM Sleep Total   Average Pulse Rate (BPM) 57.6 56.3 60.8 56.9 57.1   Minimum Pulse Rate (BPM) 50.0 48.0 51.0 48.0 48.0   Maximum Pulse Rate (BPM) 83.0 76.0 76.0 76.0 83.0     Pulse Rate Distribution    Range (bpm) Time in range (min) Time in range (%)   0.0 - 40.0 - -   40.0 - 60.0 360.7 88.7%   60.0 - 80.0 45.6 11.2%   80.0 - 100.0 0.3 0.1%   100.0 - 120.0 - -   120.0 - 140.0 - -    140.0 - 200.0 - -     EtCO2 Summary    Stage Min (mmHg) Average (mmHg) Max (mmHg)   Wake - - -   NREM(1+2+3) - - -   REM - - -     Range (mmHg) Time in range (min) Time in range (%)   20.0 - 40.0 - -   40.0 - 50.0 - -   50.0 - 55.0 - -   55.0 - 100.0 - -   Excluded data <20.0 & >65.0 410.0 100.0%     TcCO2 Summary    Stage Min (mmHg) Average (mmHg) Max (mmHg)   Wake - - -   NREM(1+2+3) - - -   REM - - -     Range (mmHg) Time in range (min) Time in range (%)   - - -   - - -   - - -   - - -   - - -     Comments    -    Titration Summary    PAP Device PAP Level O2 Level Time (min) TST (min) NREM (min) REM (min) Wake (min) Sleep Eff% OA# CA# MA# Hyp# AHI RERA RDI Min SpO2 SpO2 ?88% (min) Ar. Index   CPAP 5 - 223.0 146.0 146.0 0.0 77.0 65.5% - - - - - - - 91.0  0.0 6.2   CPAP 6 - 129.0 127.0 114.0 13.0 2.0 98.4% 1 1 - 5 3.3 - 3.3 86.0  0.5 3.3   CPAP 7 - 58.0 29.5 7.0 22.5 28.5 50.9% 1 3 - 3 14.2 - 14.2 86.0  0.3 14.2

## 2023-05-01 ENCOUNTER — TELEPHONE (OUTPATIENT)
Dept: SLEEP MEDICINE | Facility: CLINIC | Age: 58
End: 2023-05-01
Payer: COMMERCIAL

## 2023-05-01 NOTE — TELEPHONE ENCOUNTER
Staff contacted patient informing them of sleep machine results, patient requested the machine be turned up to 8 as advised by sleep provider.

## 2023-06-06 ENCOUNTER — PATIENT MESSAGE (OUTPATIENT)
Dept: SLEEP MEDICINE | Facility: CLINIC | Age: 58
End: 2023-06-06
Payer: COMMERCIAL

## 2023-06-07 ENCOUNTER — PATIENT MESSAGE (OUTPATIENT)
Dept: PSYCHIATRY | Facility: CLINIC | Age: 58
End: 2023-06-07
Payer: COMMERCIAL

## 2023-06-13 ENCOUNTER — DOCUMENTATION ONLY (OUTPATIENT)
Dept: PSYCHIATRY | Facility: CLINIC | Age: 58
End: 2023-06-13

## 2023-06-13 NOTE — PROGRESS NOTES
Patient arrived for virtual CBT-I intake and stated he was unsure if he needed to repeat CBT-I as he has completed it twice before through an online program. Patient and provider discussed the CBT-I program in brief detail and patient stated he feels he is able to complete the intervention on his own. Patient was only in video call for 8 minutes and a full intake was not completed, so he will not be billed for visit.

## 2023-08-02 ENCOUNTER — PATIENT MESSAGE (OUTPATIENT)
Dept: SLEEP MEDICINE | Facility: CLINIC | Age: 58
End: 2023-08-02
Payer: COMMERCIAL

## 2023-08-03 RX ORDER — ESZOPICLONE 2 MG/1
2 TABLET, FILM COATED ORAL NIGHTLY PRN
Qty: 30 TABLET | Refills: 5 | Status: SHIPPED | OUTPATIENT
Start: 2023-08-03

## 2024-01-05 ENCOUNTER — PATIENT MESSAGE (OUTPATIENT)
Dept: SLEEP MEDICINE | Facility: CLINIC | Age: 59
End: 2024-01-05
Payer: COMMERCIAL

## 2024-01-08 DIAGNOSIS — G47.33 OSA (OBSTRUCTIVE SLEEP APNEA): Primary | ICD-10-CM

## 2024-03-18 ENCOUNTER — PATIENT MESSAGE (OUTPATIENT)
Dept: SLEEP MEDICINE | Facility: CLINIC | Age: 59
End: 2024-03-18
Payer: COMMERCIAL

## 2024-10-07 ENCOUNTER — PATIENT MESSAGE (OUTPATIENT)
Dept: SLEEP MEDICINE | Facility: CLINIC | Age: 59
End: 2024-10-07
Payer: COMMERCIAL

## 2024-12-02 ENCOUNTER — OFFICE VISIT (OUTPATIENT)
Dept: SLEEP MEDICINE | Facility: CLINIC | Age: 59
End: 2024-12-02
Payer: COMMERCIAL

## 2024-12-02 VITALS
WEIGHT: 191.56 LBS | BODY MASS INDEX: 28.37 KG/M2 | SYSTOLIC BLOOD PRESSURE: 133 MMHG | HEIGHT: 69 IN | DIASTOLIC BLOOD PRESSURE: 86 MMHG | HEART RATE: 68 BPM

## 2024-12-02 DIAGNOSIS — G47.33 OSA (OBSTRUCTIVE SLEEP APNEA): ICD-10-CM

## 2024-12-02 DIAGNOSIS — F51.09 OTHER INSOMNIA NOT DUE TO A SUBSTANCE OR KNOWN PHYSIOLOGICAL CONDITION: Primary | ICD-10-CM

## 2024-12-02 PROCEDURE — 1159F MED LIST DOCD IN RCRD: CPT | Mod: CPTII,S$GLB,, | Performed by: INTERNAL MEDICINE

## 2024-12-02 PROCEDURE — 3075F SYST BP GE 130 - 139MM HG: CPT | Mod: CPTII,S$GLB,, | Performed by: INTERNAL MEDICINE

## 2024-12-02 PROCEDURE — 99999 PR PBB SHADOW E&M-EST. PATIENT-LVL III: CPT | Mod: PBBFAC,,, | Performed by: INTERNAL MEDICINE

## 2024-12-02 PROCEDURE — 3008F BODY MASS INDEX DOCD: CPT | Mod: CPTII,S$GLB,, | Performed by: INTERNAL MEDICINE

## 2024-12-02 PROCEDURE — 99214 OFFICE O/P EST MOD 30 MIN: CPT | Mod: S$GLB,,, | Performed by: INTERNAL MEDICINE

## 2024-12-02 PROCEDURE — 3079F DIAST BP 80-89 MM HG: CPT | Mod: CPTII,S$GLB,, | Performed by: INTERNAL MEDICINE

## 2024-12-02 NOTE — PROGRESS NOTES
"ESTABLISHED PATIENT VISIT    Alirio Hooper  is a pleasant 59 y.o. male  established with the [facility] sleep medicine clinic    Here today for follow-up     Since last visit:   See interval history in table below      Vitals:    12/02/24 1444   BP: 133/86   BP Location: Left arm   Patient Position: Sitting   Pulse: 68   Weight: 86.9 kg (191 lb 9.3 oz)   Height: 5' 9" (1.753 m)        Physical Exam:  GEN:   Well-appearing  Psych:  Appropriate affect, demonstrates insight  SKIN:  No rash on the face or bridge of the nose    LABS:   No results found for: "HGB", "CO2"      RECORDS REVIEWED PREVIOUSLY:    HST 2/21/2022: AHI 8, RDI 12, apneas unscored    7.12.22: 27/30 x 5h 7min, 5-12 (7.0/8.8/9.7) Leak 6/18/28, AHI 3.3  7.21.22: 29/30 x 5h 55min 5-12 (7.2/9.1/10.1), Leak 6/20/31, AHI 3.5 (c 2.3)    ASSESSMENT    PMH:   PROBLEM DESCRIPTION/ Sx on Presentation Interval Hx STATUS PLAN   Mild DWAIN   PMH significant for GERD, prostatitis who presents today with trouble falling asleep.      + snoring (on his back), + snoring arousals on his back  HEENT: MP1,    PAP: good usage and efficacy    PAP history   Dx Study    Mask Nasal pillows  Nasal (some mouth leaking)   occasionally wakes him up  Mouth taping didn't help   DME HME   My Air    CPAP age 2022   PAP altn    Benefits    PROBS Some mouth venting        LOV 2023:    PAP 4/14/23: griffin fx pillows  + chin strap  5cwp + sN3, lat N2, 6cwp +/- lat REM, 7cwp +/- SREM with breakthrough hypopneas,   RX= Emin8 push min to ? cwp, Ramp slow ok    6.3.23 19/19 x 6h1min, 8-12 (8.1/9.9/10.7), Leak 4/18/28, AHI 4.1 (c 2.9)    -> CPAP =10-12, ramp 6 x auto  1.8.24: pressure too high, can't wear it.    -> 6-8 + side sleeping, consider FFM    3.19.24: better with lower pressures, try FFM    12.2.24: last usage 10.26.24: 22/22 x [id number], 6-8 (6.4/7.5/7.7) leak 1/6/13, AHI 2.8    Recent dx of afib       partially controlled         PAP PLAN   E min 6 cwp (decr to 5)   I max 8 cwp (decr 7) "   PS/epr    RAMP    Other    Altn.      -recommend sidesleeping    -consider CPAP max 2.0 if trying FFM again     -trial of Flonase experimenting    -consider Mad if not making progress    The patient is using and benefitting from PAP therapy     Insomnia   SLEEP SCHEDULE   Duration Started in November 2021   Wind- down    Envmnt    CBTi   CBTi online: tried sleepstation (sleep restriction)   Meds prior rebound insomnia off of clonazepam 0.5mg  Xanax  lunesta 2mg (no longer needing)     Meds now  remeron 7.5 mg prn   Bed Time 10pm   Lights out    Latency 15min -120min   Arousals 1-2   Back to sleep Minutes to hours   Stim. ctrl    Wake time 6 AM   Caffeine    Naps    Nocturia 1-2   Work     Some improvement with PAP  Stimulus control helping            Sleeping well during the night      CONTINUE:  -continue stimulus control  -remeron 7.5mg prn        Other issues: anxiety (started on wellbutrin)

## 2025-02-18 ENCOUNTER — PATIENT MESSAGE (OUTPATIENT)
Dept: SLEEP MEDICINE | Facility: CLINIC | Age: 60
End: 2025-02-18
Payer: COMMERCIAL

## 2025-02-20 DIAGNOSIS — G47.33 OSA (OBSTRUCTIVE SLEEP APNEA): Primary | ICD-10-CM

## 2025-02-20 DIAGNOSIS — Z99.89 INSUFFICIENT TREATMENT WITH NASAL CPAP: ICD-10-CM

## 2025-02-21 DIAGNOSIS — G47.33 OSA (OBSTRUCTIVE SLEEP APNEA): Primary | ICD-10-CM

## 2025-02-21 DIAGNOSIS — Z78.9 INTOLERANCE OF CONTINUOUS POSITIVE AIRWAY PRESSURE (CPAP) VENTILATION: ICD-10-CM

## 2025-03-06 ENCOUNTER — TELEPHONE (OUTPATIENT)
Dept: SLEEP MEDICINE | Facility: OTHER | Age: 60
End: 2025-03-06
Payer: COMMERCIAL

## 2025-03-14 ENCOUNTER — TELEPHONE (OUTPATIENT)
Dept: SLEEP MEDICINE | Facility: OTHER | Age: 60
End: 2025-03-14
Payer: COMMERCIAL

## 2025-03-18 ENCOUNTER — TELEPHONE (OUTPATIENT)
Dept: SLEEP MEDICINE | Facility: OTHER | Age: 60
End: 2025-03-18
Payer: COMMERCIAL

## 2025-03-25 ENCOUNTER — TELEPHONE (OUTPATIENT)
Dept: SLEEP MEDICINE | Facility: OTHER | Age: 60
End: 2025-03-25
Payer: COMMERCIAL

## 2025-03-25 ENCOUNTER — HOSPITAL ENCOUNTER (OUTPATIENT)
Dept: SLEEP MEDICINE | Facility: OTHER | Age: 60
Discharge: HOME | End: 2025-03-25
Attending: INTERNAL MEDICINE
Payer: COMMERCIAL

## 2025-03-25 DIAGNOSIS — G47.33 OSA (OBSTRUCTIVE SLEEP APNEA): ICD-10-CM

## 2025-03-25 DIAGNOSIS — Z78.9 INTOLERANCE OF CONTINUOUS POSITIVE AIRWAY PRESSURE (CPAP) VENTILATION: ICD-10-CM

## 2025-03-25 PROCEDURE — 95811 POLYSOM 6/>YRS CPAP 4/> PARM: CPT

## 2025-03-26 NOTE — PROGRESS NOTES
Alirio Hooper to [facility] for an overnight sleep study on 03/25/2025.  Pt education,cpap/Bipap explanation given prior to testing.  Disposable leads and sensors used where applicable.   Bipap titration with pt's own Medium Res Med Gonsales nasal pillows and chinstrap.  Post study information given.

## 2025-03-26 NOTE — PROCEDURES
"[facility]    Titration Interpretation Report    Patient Name:  Alirio Hooper  MRN#:  [id number]  :  1965  Study Date:  3/25/2025  Referring Provider:  DANY Schaffer MD    The patient is a 60 year old Male who is 5' 9" and weighs 191.0 lbs.  His BMI equals 28.3.  A full night PAP titration was performed.    Polysomnogram Data  A full night polysomnogram recorded the standard physiologic parameters including EEG, EOG, EMG, EKG, nasal and oral airflow.  Respiratory parameters of chest and abdominal movements were recorded with (RIP) Respiratory Inductance Plethsmography.  Oxygen saturation was recorded by pulse oximetry.    Titration Summary  The patient was titrated at pressures ranging from 8/4/0* cm/H20 with supplemental oxygen at - up to 10/6/0* cm/H20 with supplemental oxygen at -.  The last pressure used in the study was 10/6/0* cm/H20 with supplemental oxygen at -.    Sleep Architecture  The total recording time of the polysomnogram was 446.4 minutes.  The total sleep time was 86.0 minutes.  The patient spent 27.3% of total sleep time in Stage N1, 72.7% in Stage N2, 0.0% in Stages N3, and 0.0% in REM.  Sleep latency was 235.4 minutes.  REM latency was - minutes.  Sleep Efficiency was 19.3%.  Total wake time was 360.5 minutes for a total wake percentage of 58.7%.  Wake after Sleep Onset was 124.5 minutes.    Respiratory Summary  The polysomnogram revealed a presence of - obstructive, - central, and - mixed apneas resulting in Total Apnea index of - events per hour.  There were 2 hypopneas resulting in Total Hypopnea index of 1.4 events per hour.  The combined Apnea/Hypopnea index was 1.4 events per hour.  There were a total of - RERA events resulting in a Respiratory Disturbance Index (RDI) of 1.4 events per hour.     Mean oxygen saturation was 94.7%.  The lowest oxygen saturation during sleep was 88.0%.  Time spent <=88% oxygen saturation was 1.1 minutes (0.3%).    Limb Movement " "Activity  There were - limb movements recorded.  Of this total, - were classified as PLMs.  Of the PLMs, - were associated with arousals.  The Limb Movement index was - per hour while the PLM index was - per hour and PLM with arousals index was - per hour.    Cardiac: single lead EKG revealed normal sinus rhythm     Mask:  pt's own Medium Res Med Gonsales nasal pillows and chinstrap   BiPAP = 8/4 cwp was effective in lateral position in stage N2 sleep  BiPAP = 9/5 cwp was effective in lateral position in stage N2 sleep    Limited REM sleep was seen during the titration   The mask used in this study worked well  The patients response to the study:  " "The Bipap was very comfortable""    Oxygenation:  No significant hypoxemia was observed     Impression:  -obstructive sleep apnea  -prolonged sleep onset latency  -decreased sleep efficiency  -no REM sleep recorded    Recommendations:  -if BiPAP will be used, recommend initial BiPAP auto settings EPAP min = 5 cwp, PS = 4 cwp, and IPAP max = 10 cwp.   -EPAP max should be increased to 6 cwp or higher depending on patient tolerance.   -the patient has follow up with Sleep Medicine        Pepe Schaffer MD    (This Sleep Study was interpreted by a Board Certified Sleep Specialist who conducted an epoch-by-epoch review of the entire raw data recording.)  (The indication for this sleep study was reviewed and deemed appropriate by AASM Practice Parameters or other reasons by a Board Certified Sleep Specialist.)      [facility]     Titration Report     Patient Name: Alirio Hooper Study Date: 3/25/2025   YOB: 1965 MRN #: [id number]   Age: 60 year SUSAN #: [id number]   Sex: Male Referring Provider: DANY Schaffer MD   Height: 5' 9" Recording Tech: Didi Fournet RRT RPSGT   Weight: 191.0 lbs Scoring Tech: Valentín Marin RRT RPSGT   BMI: 28.3 Interpreting Physician: -   ESS: - Neck Circumference: -      Study Overview     Lights Off: 10:11:49 PM   Count Index "   Lights On: 05:38:12 AM Awakenings: 14 9.8   Time in Bed: 446.4 min. Arousals: 15 10.5   Total Sleep Time: 86.0 min. Apneas & Hypopneas: 2 1.4    Sleep Efficiency: 19.3% Limb Movements: - -   Sleep Latency: 235.4 min. Snores: - -   Wake After Sleep Onset: 124.5 min. Desaturations: 8 5.6    REM Latency from Sleep Onset: - min. Minimum SpO2 TST: 88.0%       Sleep Architecture                                                                                                                           % of Time in Bed     Stages Time (mins) % Sleep Time   Wake 360.5     Stage N1 23.5 27.3%   Stage N2 62.5 72.7%   Stage N3 0.0 0.0%   REM 0.0 0.0%         Arousal Summary       NREM REM Sleep Index   Respiratory Arousals - - - -   PLM Arousals - - - -   Isolated Limb Movement Arousals - - - -   Spontaneous Arousals 15 - 15 10.5   Total 15 - 15 10.5         Limb Movement Summary       Count Index   Isolated Limb Movements - -   Periodic Limb Movements (PLMs) - -   Total Limb Movements - -       Respiratory Summary       By Sleep Stage By Body Position Total    NREM REM Supine Non-Supine    Time (min) 86.0 0.0 - 86.0 86.0                 Obstructive Apnea - - - - -   Mixed Apnea - - - - -   Central Apnea - - - - -   Central Apnea Index - - - - -   Total Apneas - - - - -   Total Apnea Index - - - - -                 Total Hypopnea 2 - - 2 2   Total Hypopnea Index 1.4 - - 1.4 1.4                 Apnea & Hypopnea 2 - - 2 2   Apnea & Hypopnea Index 1.4 - - 1.4 1.4                 RERAs - - - - -   RERA Index - - - - -                 RDI 1.4 - - 1.4 1.4      Scoring Criteria: Hypopneas scored at 3% desaturation criteria.     Respiratory Event Durations       Apnea Hypopnea    NREM REM NREM REM   Average (seconds) - - 18.6 -   Maximum (seconds) - - 19.2 -         Oxygen Saturation Summary       Wake NREM REM TST Total   Average SpO2 95.1% 93.0% - 93.0% 94.7%   Minimum SpO2 89.0% 88.0% - 88.0% 88.0%   Maximum SpO2 99.0% 97.0% - 97.0%  99.0%      Oxygen Saturation Distribution     Range (%) Time in range (min) Time in range (%)    90.0 - 100.0 355.3 93.5%   80.0 - 90.0 15.1 4.0%   70.0 - 80.0 - -   60.0 - 70.0 - -   50.0 - 60.0 - -   0.0 - 50.0 - -   Time Spent <=88% SpO2     Range (%) Time in range (min) Time in range (%)   0.0 - 88.0 1.1 0.3%              Count Index   Desaturations 8 5.6           Cardiac Summary       Wake NREM REM Sleep Total   Average Pulse Rate (BPM) 67.5 56.2 - 56.2 64.9   Minimum Pulse Rate (BPM) 51.0 50.0 - 50.0 50.0   Maximum Pulse Rate (BPM) 92.0 69.0 - 69.0 92.0      Pulse Rate Distribution     Range (bpm) Time in range (min) Time in range (%)   0.0 - 40.0 - -   40.0 - 60.0 105.3 27.7%   60.0 - 80.0 269.0 70.8%   80.0 - 100.0 5.5 1.4%   100.0 - 120.0 - -   120.0 - 140.0 - -   140.0 - 200.0 - -      EtCO2 Summary     Stage Min (mmHg) Average (mmHg) Max (mmHg)   Wake - - -   NREM(1+2+3) - - -   REM - - -      Range (mmHg) Time in range (min) Time in range (%)   - - -   - - -   - - -   - - -   - - -      TcCO2 Summary     Stage Min (mmHg) Average (mmHg) Max (mmHg)   Wake - - -   NREM(1+2+3) - - -   REM - - -      Range (mmHg) Time in range (min) Time in range (%)   20.0 - 40.0 - -   40.0 - 50.0 - -   50.0 - 55.0 - -   55.0 - 100.0 - -   Excluded data <20.0 & >65.0 446.5 100.0%      Comments     -         Titration Summary     PAP Device PAP Level O2 Level Time (min) TST (min) NREM (min) REM (min) Wake (min) Sleep Eff% OA# CA# MA# Hyp# AHI RERA RDI Min SpO2 SpO2 <=88% (min) Ar. Index   BiLevel 8/4/0 - 308.0 31.5 31.5 0.0 276.5 10.2% - - - - - - - 90.0  0.0 9.5   BiLevel 9/5/0 - 130.5 48.5 48.5 0.0 82.0 37.2% - - - 2 2.5 - 2.5 88.0  1.1 7.4   BiLevel 10/6/0 - 8.0 6.0 6.0 0.0 2.0 75.0% - - - - - - - 89.0  0.0 40.0

## 2025-03-31 PROCEDURE — 95811 POLYSOM 6/>YRS CPAP 4/> PARM: CPT | Mod: 26,,, | Performed by: INTERNAL MEDICINE

## 2025-04-02 ENCOUNTER — PATIENT MESSAGE (OUTPATIENT)
Dept: SLEEP MEDICINE | Facility: CLINIC | Age: 60
End: 2025-04-02
Payer: COMMERCIAL

## 2025-04-12 NOTE — PROGRESS NOTES
"ESTABLISHED PATIENT VISIT    Alirio Hooper  is a pleasant 60 y.o. male  established with the [facility] sleep medicine clinic    Here today for follow-up     Since last visit:   See interval history in table below    Vitals:    04/14/25 1532   Weight: 86 kg (189 lb 9.5 oz)   Height: 5' 9" (1.753 m)       Physical Exam:  GEN:   Well-appearing  Psych:  Appropriate affect, demonstrates insight  SKIN:  No rash on the face or bridge of the nose    LABS:   Lab Results   Component Value Date    CO2 31 10/19/2024         RECORDS REVIEWED PREVIOUSLY:    HST 2/21/2022: AHI 8, RDI 12, apneas unscored    PAP 4/14/23: griffin fx pillows  + chin strap  5cwp + sN3, lat N2, 6cwp +/- lat REM, 7cwp +/- SREM with breakthrough hypopneas,   RX= Emin8 push min to ? cwp, Ramp slow ok    7.12.22: 27/30 x 5h 7min, 5-12 (7.0/8.8/9.7) Leak 6/18/28, AHI 3.3  7.21.22: 29/30 x 5h 55min 5-12 (7.2/9.1/10.1), Leak 6/20/31, AHI 3.5 (c 2.3)  6.3.23 19/19 x 6h1min, 8-12 (8.1/9.9/10.7), Leak 4/18/28, AHI 4.1 (c 2.9)  12.2.24: last usage 10.26.24: 22/22 x [id number], 6-8 (6.4/7.5/7.7) leak 1/6/13, AHI 2.8  2.18.25: 18/18 x [id number], 5-7 (5.6/6.8/6.9), leak 0/4/13, AHI 1.6 (c 0.9)    ASSESSMENT    PMH:   significant for GERD, prostatitis, afib, DWAIN    PROBLEM DESCRIPTION/ Sx on Presentation Interval Hx STATUS PLAN   Mild DWAIN   Trouble falling asleep    + snoring (on his back), + snoring arousals on his back  HEENT: MP1,    PAP: good usage and efficacy    PAP history   Dx Study    Mask Nasal pillows  Nasal (some mouth leaking)   occasionally wakes him up  Mouth taping didn't help     DME HME   My Air    CPAP age 2022   PAP altn    Benefits Better rested if gets 9 hours   PROBS Some mouth venting    Trouble with higher pressures        LOV:  12.2.24    2.19.25:   Using CPAP  sleep quality not good  Waking later at night  Remeron 7.5mg, metoprolol for PVCs    BiPAP 3.25.25:  pt's own Medium Res Med Griffin nasal pillows and chinstrap   Trouble with 9/5 (better with " 8/4)  Prolonged SOL  No REM  Rec: if bipap  min 5(push to ? For REM) ps4 max 10  Min Ramp: 4 ok  Trigger M, cycle high    Had trouble sleeping     4.12.25: 29/30 x [id number], 5-7 (5.9/6.8/6.9), leak 0/10/19, AHI 2.7 (c 1.3, o 1.3)         partially controlled     PAP PLAN   E min 5   I max 7   PS/epr    RAMP    Other Try FFM (hard to sleep on his side)  Flonase trial       Altn. Consider MAD  sidesleeping     Pressure Increase to 6-8, ramp off  EPR 3 ramp only (from Ft)     The patient is using and benefitting from PAP therapy  .  CHECKOUT   Recall will arrange RTC depending on results of sleep testing      DME    Other         Insomnia   SLEEP SCHEDULE   Duration Started in November 2021   Wind- down    Envmnt    CBTi CBTi online: tried sleepstation (sleep restriction)   Meds prior clonazepam 0.5mg   Xanax  lunesta 2mg  (lost effectiveness)   Meds now  remeron 7.5 mg prn   Bed Time 10pm   Lights out    Latency 15min -120min   Arousals 1-2   Back to sleep Minutes to hours   Stim. ctrl    Wake time 6 AM   Caffeine    Naps    Nocturia 1-2   Work     Some improvement with PAP  Stimulus control helping    Falling asleep better    Unrefreshing sleep starting circa 2024        Waking later at night    Sleep is still partially refreshing         CONTINUE:  -continue stimulus control  -remeron 7.5mg prn          Other issues: anxiety (started on wellbutrin)

## 2025-04-14 ENCOUNTER — OFFICE VISIT (OUTPATIENT)
Dept: SLEEP MEDICINE | Facility: CLINIC | Age: 60
End: 2025-04-14
Payer: COMMERCIAL

## 2025-04-14 VITALS — HEIGHT: 69 IN | BODY MASS INDEX: 28.08 KG/M2 | WEIGHT: 189.63 LBS

## 2025-04-14 DIAGNOSIS — G47.33 OSA (OBSTRUCTIVE SLEEP APNEA): Primary | ICD-10-CM

## 2025-04-14 DIAGNOSIS — G47.09 OTHER INSOMNIA: ICD-10-CM

## 2025-04-14 PROCEDURE — 99999 PR PBB SHADOW E&M-EST. PATIENT-LVL II: CPT | Mod: PBBFAC,,, | Performed by: INTERNAL MEDICINE

## 2025-04-14 PROCEDURE — 1159F MED LIST DOCD IN RCRD: CPT | Mod: CPTII,S$GLB,, | Performed by: INTERNAL MEDICINE

## 2025-04-14 PROCEDURE — 99214 OFFICE O/P EST MOD 30 MIN: CPT | Mod: S$GLB,,, | Performed by: INTERNAL MEDICINE

## 2025-04-14 PROCEDURE — 3008F BODY MASS INDEX DOCD: CPT | Mod: CPTII,S$GLB,, | Performed by: INTERNAL MEDICINE

## 2025-06-16 DIAGNOSIS — G47.33 OSA (OBSTRUCTIVE SLEEP APNEA): Primary | ICD-10-CM
